# Patient Record
Sex: FEMALE | Race: WHITE | HISPANIC OR LATINO | Employment: UNEMPLOYED | URBAN - METROPOLITAN AREA
[De-identification: names, ages, dates, MRNs, and addresses within clinical notes are randomized per-mention and may not be internally consistent; named-entity substitution may affect disease eponyms.]

---

## 2023-06-14 ENCOUNTER — OFFICE VISIT (OUTPATIENT)
Dept: PRIMARY CARE | Facility: CLINIC | Age: 23
End: 2023-06-14
Payer: MEDICARE

## 2023-06-14 VITALS
BODY MASS INDEX: 27.54 KG/M2 | OXYGEN SATURATION: 98 % | HEART RATE: 80 BPM | RESPIRATION RATE: 17 BRPM | DIASTOLIC BLOOD PRESSURE: 62 MMHG | HEIGHT: 55 IN | SYSTOLIC BLOOD PRESSURE: 91 MMHG | WEIGHT: 119 LBS

## 2023-06-14 DIAGNOSIS — E03.8 OTHER SPECIFIED HYPOTHYROIDISM: ICD-10-CM

## 2023-06-14 DIAGNOSIS — Q90.9 DOWN SYNDROME (HHS-HCC): ICD-10-CM

## 2023-06-14 DIAGNOSIS — H60.501 ACUTE OTITIS EXTERNA OF RIGHT EAR, UNSPECIFIED TYPE: ICD-10-CM

## 2023-06-14 DIAGNOSIS — E66.9 OBESITY, UNSPECIFIED CLASSIFICATION, UNSPECIFIED OBESITY TYPE, UNSPECIFIED WHETHER SERIOUS COMORBIDITY PRESENT: ICD-10-CM

## 2023-06-14 DIAGNOSIS — Z00.00 HEALTH MAINTENANCE EXAMINATION: Primary | ICD-10-CM

## 2023-06-14 DIAGNOSIS — L98.9 SKIN LESION: ICD-10-CM

## 2023-06-14 DIAGNOSIS — L73.9 FOLLICULITIS: ICD-10-CM

## 2023-06-14 DIAGNOSIS — D80.4 IGM DEFICIENCY (MULTI): ICD-10-CM

## 2023-06-14 DIAGNOSIS — E55.9 MILD VITAMIN D DEFICIENCY: ICD-10-CM

## 2023-06-14 LAB
ALANINE AMINOTRANSFERASE (SGPT) (U/L) IN SER/PLAS: 23 U/L (ref 7–45)
ALBUMIN (G/DL) IN SER/PLAS: 4.6 G/DL (ref 3.4–5)
ALKALINE PHOSPHATASE (U/L) IN SER/PLAS: 63 U/L (ref 33–110)
ANION GAP IN SER/PLAS: 9 MMOL/L (ref 10–20)
ASPARTATE AMINOTRANSFERASE (SGOT) (U/L) IN SER/PLAS: 17 U/L (ref 9–39)
BASOPHILS (10*3/UL) IN BLOOD BY AUTOMATED COUNT: 0.07 X10E9/L (ref 0–0.1)
BASOPHILS/100 LEUKOCYTES IN BLOOD BY AUTOMATED COUNT: 1.3 % (ref 0–2)
BILIRUBIN TOTAL (MG/DL) IN SER/PLAS: 0.5 MG/DL (ref 0–1.2)
CALCIDIOL (25 OH VITAMIN D3) (NG/ML) IN SER/PLAS: 26 NG/ML
CALCIUM (MG/DL) IN SER/PLAS: 9.5 MG/DL (ref 8.6–10.6)
CARBON DIOXIDE, TOTAL (MMOL/L) IN SER/PLAS: 33 MMOL/L (ref 21–32)
CHLORIDE (MMOL/L) IN SER/PLAS: 102 MMOL/L (ref 98–107)
CHOLESTEROL (MG/DL) IN SER/PLAS: 188 MG/DL (ref 0–199)
CHOLESTEROL IN HDL (MG/DL) IN SER/PLAS: 54.5 MG/DL
CHOLESTEROL/HDL RATIO: 3.4
CREATININE (MG/DL) IN SER/PLAS: 0.69 MG/DL (ref 0.5–1.05)
EOSINOPHILS (10*3/UL) IN BLOOD BY AUTOMATED COUNT: 0.05 X10E9/L (ref 0–0.7)
EOSINOPHILS/100 LEUKOCYTES IN BLOOD BY AUTOMATED COUNT: 0.9 % (ref 0–6)
ERYTHROCYTE DISTRIBUTION WIDTH (RATIO) BY AUTOMATED COUNT: 13.8 % (ref 11.5–14.5)
ERYTHROCYTE MEAN CORPUSCULAR HEMOGLOBIN CONCENTRATION (G/DL) BY AUTOMATED: 32.5 G/DL (ref 32–36)
ERYTHROCYTE MEAN CORPUSCULAR VOLUME (FL) BY AUTOMATED COUNT: 102 FL (ref 80–100)
ERYTHROCYTES (10*6/UL) IN BLOOD BY AUTOMATED COUNT: 4.14 X10E12/L (ref 4–5.2)
GFR FEMALE: >90 ML/MIN/1.73M2
GLUCOSE (MG/DL) IN SER/PLAS: 84 MG/DL (ref 74–99)
HEMATOCRIT (%) IN BLOOD BY AUTOMATED COUNT: 42.2 % (ref 36–46)
HEMOGLOBIN (G/DL) IN BLOOD: 13.7 G/DL (ref 12–16)
IGA (MG/DL) IN SER/PLAS: 275 MG/DL (ref 70–400)
IGG (MG/DL) IN SER/PLAS: 957 MG/DL (ref 700–1600)
IGM (MG/DL) IN SER/PLAS: 46 MG/DL (ref 40–230)
IMMATURE GRANULOCYTES/100 LEUKOCYTES IN BLOOD BY AUTOMATED COUNT: 0.2 % (ref 0–0.9)
LEUKOCYTES (10*3/UL) IN BLOOD BY AUTOMATED COUNT: 5.3 X10E9/L (ref 4.4–11.3)
LYMPHOCYTES (10*3/UL) IN BLOOD BY AUTOMATED COUNT: 1.02 X10E9/L (ref 1.2–4.8)
LYMPHOCYTES/100 LEUKOCYTES IN BLOOD BY AUTOMATED COUNT: 19.4 % (ref 13–44)
MONOCYTES (10*3/UL) IN BLOOD BY AUTOMATED COUNT: 0.24 X10E9/L (ref 0.1–1)
MONOCYTES/100 LEUKOCYTES IN BLOOD BY AUTOMATED COUNT: 4.6 % (ref 2–10)
NEUTROPHILS (10*3/UL) IN BLOOD BY AUTOMATED COUNT: 3.88 X10E9/L (ref 1.2–7.7)
NEUTROPHILS/100 LEUKOCYTES IN BLOOD BY AUTOMATED COUNT: 73.6 % (ref 40–80)
NON-HDL CHOLESTEROL: 134 MG/DL (ref 0–149)
NRBC (PER 100 WBCS) BY AUTOMATED COUNT: 0 /100 WBC (ref 0–0)
PLATELETS (10*3/UL) IN BLOOD AUTOMATED COUNT: 343 X10E9/L (ref 150–450)
POTASSIUM (MMOL/L) IN SER/PLAS: 4.4 MMOL/L (ref 3.5–5.3)
PROTEIN TOTAL: 7.3 G/DL (ref 6.4–8.2)
SODIUM (MMOL/L) IN SER/PLAS: 140 MMOL/L (ref 136–145)
THYROTROPIN (MIU/L) IN SER/PLAS BY DETECTION LIMIT <= 0.05 MIU/L: 2.78 MIU/L (ref 0.44–3.98)
UREA NITROGEN (MG/DL) IN SER/PLAS: 19 MG/DL (ref 6–23)

## 2023-06-14 PROCEDURE — 82465 ASSAY BLD/SERUM CHOLESTEROL: CPT

## 2023-06-14 PROCEDURE — 82784 ASSAY IGA/IGD/IGG/IGM EACH: CPT

## 2023-06-14 PROCEDURE — 1036F TOBACCO NON-USER: CPT | Performed by: STUDENT IN AN ORGANIZED HEALTH CARE EDUCATION/TRAINING PROGRAM

## 2023-06-14 PROCEDURE — 83718 ASSAY OF LIPOPROTEIN: CPT

## 2023-06-14 PROCEDURE — 82306 VITAMIN D 25 HYDROXY: CPT

## 2023-06-14 PROCEDURE — 84443 ASSAY THYROID STIM HORMONE: CPT

## 2023-06-14 PROCEDURE — 80053 COMPREHEN METABOLIC PANEL: CPT

## 2023-06-14 PROCEDURE — 99395 PREV VISIT EST AGE 18-39: CPT | Performed by: STUDENT IN AN ORGANIZED HEALTH CARE EDUCATION/TRAINING PROGRAM

## 2023-06-14 PROCEDURE — 85025 COMPLETE CBC W/AUTO DIFF WBC: CPT

## 2023-06-14 RX ORDER — CLINDAMYCIN PHOSPHATE 10 UG/ML
LOTION TOPICAL 2 TIMES DAILY
Qty: 60 ML | Refills: 3 | Status: SHIPPED | OUTPATIENT
Start: 2023-06-14

## 2023-06-14 RX ORDER — CLINDAMYCIN PHOSPHATE 10 MG/G
GEL TOPICAL 2 TIMES DAILY
Qty: 60 G | Refills: 5 | Status: SHIPPED | OUTPATIENT
Start: 2023-06-14 | End: 2023-06-14

## 2023-06-14 RX ORDER — MUPIROCIN 20 MG/G
OINTMENT TOPICAL 2 TIMES DAILY
Qty: 22 G | Refills: 0 | Status: SHIPPED | OUTPATIENT
Start: 2023-06-14 | End: 2023-06-24

## 2023-06-14 RX ORDER — CIPROFLOXACIN AND DEXAMETHASONE 3; 1 MG/ML; MG/ML
4 SUSPENSION/ DROPS AURICULAR (OTIC) 2 TIMES DAILY
Qty: 7.5 ML | Refills: 0 | Status: SHIPPED | OUTPATIENT
Start: 2023-06-14 | End: 2023-06-21

## 2023-06-14 NOTE — PATIENT INSTRUCTIONS
Health Passport: [email or web address]    Labs today in [address].    Referrals:   Dermatology  Immunology (Dr. LAVON Gallegos recommended)     Prescriptions:   Ciprodex ear drops--call me if too expensive  Mupirocin for folliculitis   Clindamycin topical for groin skin lesions     Follow up with oncology survivorship and for echo    Pleasure meeting you today!    Best,  Dr. Sierra

## 2023-06-14 NOTE — PROGRESS NOTES
Estela Molina is a 23 y.o. female seen in Clinic at /[facility] by Dr. 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 She presents today to establish care and for CPE. Patient is accompanied to this visit by her mother.    ACUTE CONCERNS:  #R Otitis Externa  - active drainage/purulence of R ear today on exam  - mild pain with manipulation   - treat with topical fluoroquinolone   - prior recurrent otitis; tube in childhood    CHRONIC MEDICAL CONDITIONS:   #Down Syndrome  #HypoT (off meds for last 3-4 months, previously 37.5mcg daily)  #AML (diagnosed 2000)  #ASD/VSD (no surgical intervention)   - follows with peds onc survivorship, peds endo  - labs today   - adult down syndrome screening guidelines  - prior TTG testing negative, no GI concerns  - TSH with screening labs  - no concerns for atlanto-axial instability per ROS   - echo with onc survivorship   [  ] follow up with onc survivorship   [  ] labs as above   [  ] immunology referral     #IgM Deficiency   - last saw Dr. Quevedo several years ago   - Immunoglobulins ordered today   [  ] Immunology referral     #Dysmenorrhea, improved   - now s/p IUD  - Dr. Vasques, GYN  - mild spotting, cramps; much improved with IUD     #Concern for HS  - trial topical clinda  - derm referral   - has had drainage with general surgery in the past     #Folliculitis   - topical mupirocin     #Chronic Constipation   - has been evaluated by GI in the past   - bowel regimen with good control at home currently     Past Medical History:   Past Medical History:   Diagnosis Date    Personal history of leukemia 01/22/2015    History of leukemia    Personal history of leukemia 08/23/2021    History of acute myeloid leukemia  in remission    Personal history of other infectious and parasitic diseases 2020    History of viral infection    Selective deficiency of immunoglobulin m (igm) (CMS/HCC)     Decreased immunoglobulin M     Subspecialty Medical Care: Peds ([facility])  Identified Adult Providers: Endo, GYN, Onc Survivorship, Immunology     Past Surgical History:   Past Surgical History:   Procedure Laterality Date    OTHER SURGICAL HISTORY  2021    Bone biopsy    OTHER SURGICAL HISTORY  2021    Bone marrow biopsy    OTHER SURGICAL HISTORY  2022    Ear pressure equalization tube insertion     Medications: previously Levothyroxine 37.5mcg daily   Current Outpatient Medications:     ciprofloxacin-dexamethasone (CiproDEX) otic suspension, Administer 4 drops into the right ear 2 times a day for 7 days., Disp: 7.5 mL, Rfl: 0    clindamycin (Clindagel) 1 % gel, Apply topically 2 times a day. apply to affected area, Disp: 60 g, Rfl: 5    mupirocin (Bactroban) 2 % ointment, Apply topically 2 times a day for 10 days. PRN for folliculitis, Disp: 22 g, Rfl: 0  Pharmacy: [email or web address] ([facility])     Allergies: NKDA  No Known Allergies    Immunizations: Tdap  (due ), likely PCV/PPSV needed; COVID primary series (no booster), HPV vaccine series complete, flu annually recommended     Family History:   No family history on file.    Social History:   Home/Living Situation: lives at home with mom, acacia, and younger brother; father  around    Education: graduated, [place]   Employment/Work/Vocational: work program, [employer]   Activities: capable of working but family feels not safe given wandering   Drug Use: non-smoker   Diet: healthy dietary habits discussed  Sexuality/Contraception/Menstrual History: follows with GYN  Suicide/Depression/Anxiety: no mood concerns, father  around 8 years ago (will make her sad)   Sleep:     Transition  "Processes:  Financial/Health Insurance: Medicare  Transportation: mother   Voting: NA  Legal/Guardian: Millie Martin  Contact Information: [phone]    Visit Vitals  BP 91/62 (BP Location: Left arm, Patient Position: Sitting)   Pulse 80   Resp 17   Ht 1.397 m (4' 7\")   Wt 54 kg (119 lb)   SpO2 98%   BMI 27.66 kg/m²   Smoking Status Never   BSA 1.45 m²      PHYSICAL EXAM:   General: well appearing, facies consistent with known Down Syndrome diagnosis, NAD, pleasant and engaged in encounter    HEENT: R ear with active yellow drainage  CV: RRR  PULM: CTAB, non-labored respirations   ABD: soft, overweight, NT, ND   : no suprapubic tenderness   EXT: WWP, no significant edema   SKIN: areas of folliculitis noted   NEURO: A&Ox4, down syndrome facies, no gross motor or sensory deficits, normal gait  PSYCH: pleasant mood, appropriate affect, appropriate interactions with mother during visit     Assessment/Plan    Estela Molina is a 23 y.o. female seen in Clinic at /[facility] by Dr. 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 Patient is accompanied to this visit by her mother.    ACUTE CONCERNS  #R Otitis Externa  - active drainage/purulence of R ear today on exam  - mild pain with manipulation   - treat with topical fluoroquinolone   - prior recurrent otitis; tube in childhood    CHRONIC MEDICAL CONDITIONS:   #Down Syndrome  #HypoT (off meds for last 3-4 months, previously 37.5mcg daily)  #AML (diagnosed 2000)  #ASD/VSD (no surgical intervention)   - follows with peds onc survivorship, peds endo  - labs today   - adult down syndrome screening guidelines  - prior TTG testing negative, no GI concerns  - TSH with screening labs  - no concerns " for atlanto-axial instability per ROS   - echo with onc survivorship   [  ] follow up with onc survivorship   [  ] labs as above   [  ] immunology referral     #IgM Deficiency   - last saw Dr. Quevedo several years ago   - Immunoglobulins ordered today   [  ] Immunology referral     #Dysmenorrhea, improved   - now s/p IUD  - Dr. Vasques, GYN  - mild spotting, cramps; much improved with IUD     #Concern for HS  - trial topical clinda  - derm referral   - has had drainage with general surgery in the past     #Folliculitis   - topical mupirocin     #Chronic Constipation   - has been evaluated by GI in the past   - bowel regimen with good control at home currently     #Health Maintenance   - Vision, Hearing screens: recommend audiology, optho  - Counseling regarding alcohol/tobacco/drug use: denies  - Depression screen: no concerns at present   - BMI, Lipid, A1C screening and nutritional/exercise counseling: labs today   - Blood Pressure: WNL  - Safe Sexual Practices, STI, HIV screening: never sexually active, IUD in place  - Pap Smear (21 years and above): follows with GYN    #Transition of Care/Young Adult Care  - Health Literacy Assessment: mother excellent advocate for her daughter   - Medications: Active medications reviewed and updated  - Allergies: Reviewed and updated   - Immunizations: Tdap 2014 (due 2024), likely PCV/PPSV needed; COVID primary series (no booster), HPV vaccine series complete, flu annually recommended   - Identified Adult or Subspecialty Providers: Endo, GYN, Onc Survivorship, Immunology   - Resources Provided: Immunology, Derm referrals, Lab screening today, Adult Down Syndrome Checklist (Global Medical Care Guidelines)   - Health Passport: Reviewed resource for relevant patients ([email or web address])  - Healthcare POA: Mother Contact Info: [phone] ([email or web address])      Cristóbal Sierra MD  Internal Medicine-Pediatrics    [address], [address]  P: [phone], F: [phone]

## 2023-06-15 DIAGNOSIS — E55.9 MILD VITAMIN D DEFICIENCY: Primary | ICD-10-CM

## 2023-06-15 RX ORDER — ASCORBIC ACID 125 MG
1 TABLET,CHEWABLE ORAL DAILY
Qty: 90 TABLET | Refills: 3 | Status: SHIPPED | OUTPATIENT
Start: 2023-06-15

## 2023-12-15 ENCOUNTER — APPOINTMENT (OUTPATIENT)
Dept: DERMATOLOGY | Facility: CLINIC | Age: 23
End: 2023-12-15
Payer: MEDICARE

## 2024-05-16 ENCOUNTER — APPOINTMENT (OUTPATIENT)
Dept: RADIOLOGY | Facility: HOSPITAL | Age: 24
End: 2024-05-16
Payer: MEDICARE

## 2024-05-16 ENCOUNTER — HOSPITAL ENCOUNTER (EMERGENCY)
Facility: HOSPITAL | Age: 24
Discharge: HOME | End: 2024-05-16
Attending: EMERGENCY MEDICINE
Payer: MEDICARE

## 2024-05-16 VITALS
DIASTOLIC BLOOD PRESSURE: 66 MMHG | TEMPERATURE: 97.9 F | SYSTOLIC BLOOD PRESSURE: 106 MMHG | HEART RATE: 110 BPM | RESPIRATION RATE: 18 BRPM | BODY MASS INDEX: 27.77 KG/M2 | WEIGHT: 120 LBS | OXYGEN SATURATION: 99 % | HEIGHT: 55 IN

## 2024-05-16 VITALS
OXYGEN SATURATION: 100 % | RESPIRATION RATE: 16 BRPM | SYSTOLIC BLOOD PRESSURE: 105 MMHG | TEMPERATURE: 98.8 F | DIASTOLIC BLOOD PRESSURE: 63 MMHG | BODY MASS INDEX: 27.77 KG/M2 | HEART RATE: 97 BPM | HEIGHT: 55 IN | WEIGHT: 120 LBS

## 2024-05-16 DIAGNOSIS — R11.2 NAUSEA AND VOMITING, UNSPECIFIED VOMITING TYPE: ICD-10-CM

## 2024-05-16 DIAGNOSIS — R10.84 GENERALIZED ABDOMINAL PAIN: Primary | ICD-10-CM

## 2024-05-16 DIAGNOSIS — R10.84 ABDOMINAL PAIN, GENERALIZED: Primary | ICD-10-CM

## 2024-05-16 LAB
ALBUMIN SERPL BCP-MCNC: 4 G/DL (ref 3.4–5)
ALBUMIN SERPL BCP-MCNC: 4.5 G/DL (ref 3.4–5)
ALP SERPL-CCNC: 44 U/L (ref 33–110)
ALP SERPL-CCNC: 52 U/L (ref 33–110)
ALT SERPL W P-5'-P-CCNC: 26 U/L (ref 7–45)
ALT SERPL W P-5'-P-CCNC: 27 U/L (ref 7–45)
ANION GAP SERPL CALC-SCNC: 13 MMOL/L (ref 10–20)
ANION GAP SERPL CALC-SCNC: 13 MMOL/L (ref 10–20)
APPEARANCE UR: CLEAR
APPEARANCE UR: CLEAR
AST SERPL W P-5'-P-CCNC: 20 U/L (ref 9–39)
AST SERPL W P-5'-P-CCNC: 21 U/L (ref 9–39)
B-HCG SERPL-ACNC: <2 MIU/ML
BACTERIA #/AREA URNS AUTO: ABNORMAL /HPF
BASOPHILS # BLD AUTO: 0.03 X10*3/UL (ref 0–0.1)
BASOPHILS NFR BLD AUTO: 0.3 %
BILIRUB SERPL-MCNC: 0.8 MG/DL (ref 0–1.2)
BILIRUB SERPL-MCNC: 0.9 MG/DL (ref 0–1.2)
BILIRUB UR STRIP.AUTO-MCNC: NEGATIVE MG/DL
BILIRUB UR STRIP.AUTO-MCNC: NEGATIVE MG/DL
BUN SERPL-MCNC: 13 MG/DL (ref 6–23)
BUN SERPL-MCNC: 16 MG/DL (ref 6–23)
CALCIUM SERPL-MCNC: 8.3 MG/DL (ref 8.6–10.3)
CALCIUM SERPL-MCNC: 8.8 MG/DL (ref 8.6–10.3)
CHLORIDE SERPL-SCNC: 99 MMOL/L (ref 98–107)
CHLORIDE SERPL-SCNC: 99 MMOL/L (ref 98–107)
CO2 SERPL-SCNC: 25 MMOL/L (ref 21–32)
CO2 SERPL-SCNC: 27 MMOL/L (ref 21–32)
COLOR UR: YELLOW
COLOR UR: YELLOW
CREAT SERPL-MCNC: 0.74 MG/DL (ref 0.5–1.05)
CREAT SERPL-MCNC: 0.77 MG/DL (ref 0.5–1.05)
EGFRCR SERPLBLD CKD-EPI 2021: >90 ML/MIN/1.73M*2
EGFRCR SERPLBLD CKD-EPI 2021: >90 ML/MIN/1.73M*2
EOSINOPHIL # BLD AUTO: 0.01 X10*3/UL (ref 0–0.7)
EOSINOPHIL NFR BLD AUTO: 0.1 %
ERYTHROCYTE [DISTWIDTH] IN BLOOD BY AUTOMATED COUNT: 13.6 % (ref 11.5–14.5)
ERYTHROCYTE [DISTWIDTH] IN BLOOD BY AUTOMATED COUNT: 13.9 % (ref 11.5–14.5)
GLUCOSE SERPL-MCNC: 109 MG/DL (ref 74–99)
GLUCOSE SERPL-MCNC: 143 MG/DL (ref 74–99)
GLUCOSE UR STRIP.AUTO-MCNC: NORMAL MG/DL
GLUCOSE UR STRIP.AUTO-MCNC: NORMAL MG/DL
HCT VFR BLD AUTO: 38.1 % (ref 36–46)
HCT VFR BLD AUTO: 40.7 % (ref 36–46)
HGB BLD-MCNC: 12.6 G/DL (ref 12–16)
HGB BLD-MCNC: 13.6 G/DL (ref 12–16)
HOLD SPECIMEN: NORMAL
IMM GRANULOCYTES # BLD AUTO: 0.04 X10*3/UL (ref 0–0.7)
IMM GRANULOCYTES NFR BLD AUTO: 0.3 % (ref 0–0.9)
KETONES UR STRIP.AUTO-MCNC: ABNORMAL MG/DL
KETONES UR STRIP.AUTO-MCNC: ABNORMAL MG/DL
LACTATE SERPL-SCNC: 1.3 MMOL/L (ref 0.4–2)
LEUKOCYTE ESTERASE UR QL STRIP.AUTO: NEGATIVE
LEUKOCYTE ESTERASE UR QL STRIP.AUTO: NEGATIVE
LIPASE SERPL-CCNC: 6 U/L (ref 9–82)
LIPASE SERPL-CCNC: 7 U/L (ref 9–82)
LYMPHOCYTES # BLD AUTO: 0.14 X10*3/UL (ref 1.2–4.8)
LYMPHOCYTES NFR BLD AUTO: 1.2 %
MAGNESIUM SERPL-MCNC: 1.73 MG/DL (ref 1.6–2.4)
MCH RBC QN AUTO: 32.7 PG (ref 26–34)
MCH RBC QN AUTO: 32.9 PG (ref 26–34)
MCHC RBC AUTO-ENTMCNC: 33.1 G/DL (ref 32–36)
MCHC RBC AUTO-ENTMCNC: 33.4 G/DL (ref 32–36)
MCV RBC AUTO: 99 FL (ref 80–100)
MCV RBC AUTO: 99 FL (ref 80–100)
MONOCYTES # BLD AUTO: 0.45 X10*3/UL (ref 0.1–1)
MONOCYTES NFR BLD AUTO: 3.9 %
MUCOUS THREADS #/AREA URNS AUTO: ABNORMAL /LPF
MUCOUS THREADS #/AREA URNS AUTO: ABNORMAL /LPF
NEUTROPHILS # BLD AUTO: 10.84 X10*3/UL (ref 1.2–7.7)
NEUTROPHILS NFR BLD AUTO: 94.2 %
NITRITE UR QL STRIP.AUTO: NEGATIVE
NITRITE UR QL STRIP.AUTO: NEGATIVE
NRBC BLD-RTO: 0 /100 WBCS (ref 0–0)
NRBC BLD-RTO: 0 /100 WBCS (ref 0–0)
PH UR STRIP.AUTO: 6.5 [PH]
PH UR STRIP.AUTO: 8 [PH]
PLATELET # BLD AUTO: 223 X10*3/UL (ref 150–450)
PLATELET # BLD AUTO: 279 X10*3/UL (ref 150–450)
POTASSIUM SERPL-SCNC: 3.2 MMOL/L (ref 3.5–5.3)
POTASSIUM SERPL-SCNC: 3.6 MMOL/L (ref 3.5–5.3)
PROT SERPL-MCNC: 6.5 G/DL (ref 6.4–8.2)
PROT SERPL-MCNC: 7.4 G/DL (ref 6.4–8.2)
PROT UR STRIP.AUTO-MCNC: ABNORMAL MG/DL
PROT UR STRIP.AUTO-MCNC: ABNORMAL MG/DL
RBC # BLD AUTO: 3.85 X10*6/UL (ref 4–5.2)
RBC # BLD AUTO: 4.13 X10*6/UL (ref 4–5.2)
RBC # UR STRIP.AUTO: NEGATIVE /UL
RBC # UR STRIP.AUTO: NEGATIVE /UL
RBC #/AREA URNS AUTO: ABNORMAL /HPF
RBC #/AREA URNS AUTO: ABNORMAL /HPF
SARS-COV-2 RNA RESP QL NAA+PROBE: NOT DETECTED
SODIUM SERPL-SCNC: 134 MMOL/L (ref 136–145)
SODIUM SERPL-SCNC: 135 MMOL/L (ref 136–145)
SP GR UR STRIP.AUTO: 1.03
SP GR UR STRIP.AUTO: 1.04
SQUAMOUS #/AREA URNS AUTO: ABNORMAL /HPF
SQUAMOUS #/AREA URNS AUTO: ABNORMAL /HPF
UROBILINOGEN UR STRIP.AUTO-MCNC: ABNORMAL MG/DL
UROBILINOGEN UR STRIP.AUTO-MCNC: NORMAL MG/DL
WBC # BLD AUTO: 11.5 X10*3/UL (ref 4.4–11.3)
WBC # BLD AUTO: 8.3 X10*3/UL (ref 4.4–11.3)
WBC #/AREA URNS AUTO: ABNORMAL /HPF
WBC #/AREA URNS AUTO: ABNORMAL /HPF
YEAST BUDDING #/AREA UR COMP ASSIST: PRESENT /HPF

## 2024-05-16 PROCEDURE — 81003 URINALYSIS AUTO W/O SCOPE: CPT | Performed by: NURSE PRACTITIONER

## 2024-05-16 PROCEDURE — 85025 COMPLETE CBC W/AUTO DIFF WBC: CPT | Performed by: NURSE PRACTITIONER

## 2024-05-16 PROCEDURE — 99283 EMERGENCY DEPT VISIT LOW MDM: CPT | Mod: 25

## 2024-05-16 PROCEDURE — 99284 EMERGENCY DEPT VISIT MOD MDM: CPT | Performed by: EMERGENCY MEDICINE

## 2024-05-16 PROCEDURE — 96360 HYDRATION IV INFUSION INIT: CPT

## 2024-05-16 PROCEDURE — 84702 CHORIONIC GONADOTROPIN TEST: CPT | Performed by: NURSE PRACTITIONER

## 2024-05-16 PROCEDURE — 85027 COMPLETE CBC AUTOMATED: CPT | Performed by: EMERGENCY MEDICINE

## 2024-05-16 PROCEDURE — 96374 THER/PROPH/DIAG INJ IV PUSH: CPT

## 2024-05-16 PROCEDURE — 83690 ASSAY OF LIPASE: CPT | Performed by: NURSE PRACTITIONER

## 2024-05-16 PROCEDURE — 83605 ASSAY OF LACTIC ACID: CPT | Performed by: NURSE PRACTITIONER

## 2024-05-16 PROCEDURE — 36415 COLL VENOUS BLD VENIPUNCTURE: CPT | Performed by: NURSE PRACTITIONER

## 2024-05-16 PROCEDURE — 99285 EMERGENCY DEPT VISIT HI MDM: CPT | Performed by: NURSE PRACTITIONER

## 2024-05-16 PROCEDURE — 2550000001 HC RX 255 CONTRASTS: Performed by: NURSE PRACTITIONER

## 2024-05-16 PROCEDURE — 96375 TX/PRO/DX INJ NEW DRUG ADDON: CPT

## 2024-05-16 PROCEDURE — 74177 CT ABD & PELVIS W/CONTRAST: CPT

## 2024-05-16 PROCEDURE — 83735 ASSAY OF MAGNESIUM: CPT | Performed by: EMERGENCY MEDICINE

## 2024-05-16 PROCEDURE — 87635 SARS-COV-2 COVID-19 AMP PRB: CPT | Performed by: NURSE PRACTITIONER

## 2024-05-16 PROCEDURE — 2500000004 HC RX 250 GENERAL PHARMACY W/ HCPCS (ALT 636 FOR OP/ED): Performed by: NURSE PRACTITIONER

## 2024-05-16 PROCEDURE — 80053 COMPREHEN METABOLIC PANEL: CPT | Mod: 91 | Performed by: EMERGENCY MEDICINE

## 2024-05-16 PROCEDURE — 81001 URINALYSIS AUTO W/SCOPE: CPT | Performed by: EMERGENCY MEDICINE

## 2024-05-16 PROCEDURE — 2500000001 HC RX 250 WO HCPCS SELF ADMINISTERED DRUGS (ALT 637 FOR MEDICARE OP): Performed by: NURSE PRACTITIONER

## 2024-05-16 PROCEDURE — 93005 ELECTROCARDIOGRAM TRACING: CPT

## 2024-05-16 PROCEDURE — 2500000001 HC RX 250 WO HCPCS SELF ADMINISTERED DRUGS (ALT 637 FOR MEDICARE OP): Performed by: EMERGENCY MEDICINE

## 2024-05-16 PROCEDURE — 2500000005 HC RX 250 GENERAL PHARMACY W/O HCPCS: Performed by: STUDENT IN AN ORGANIZED HEALTH CARE EDUCATION/TRAINING PROGRAM

## 2024-05-16 PROCEDURE — 83690 ASSAY OF LIPASE: CPT | Mod: 91 | Performed by: EMERGENCY MEDICINE

## 2024-05-16 PROCEDURE — 96361 HYDRATE IV INFUSION ADD-ON: CPT

## 2024-05-16 PROCEDURE — 74177 CT ABD & PELVIS W/CONTRAST: CPT | Mod: FOREIGN READ | Performed by: RADIOLOGY

## 2024-05-16 PROCEDURE — 99285 EMERGENCY DEPT VISIT HI MDM: CPT | Mod: 25

## 2024-05-16 PROCEDURE — 2500000004 HC RX 250 GENERAL PHARMACY W/ HCPCS (ALT 636 FOR OP/ED): Performed by: EMERGENCY MEDICINE

## 2024-05-16 PROCEDURE — 84075 ASSAY ALKALINE PHOSPHATASE: CPT | Performed by: NURSE PRACTITIONER

## 2024-05-16 RX ORDER — DICYCLOMINE HYDROCHLORIDE 10 MG/1
20 CAPSULE ORAL ONCE
Status: COMPLETED | OUTPATIENT
Start: 2024-05-16 | End: 2024-05-16

## 2024-05-16 RX ORDER — ONDANSETRON HYDROCHLORIDE 2 MG/ML
4 INJECTION, SOLUTION INTRAVENOUS ONCE
Status: COMPLETED | OUTPATIENT
Start: 2024-05-16 | End: 2024-05-16

## 2024-05-16 RX ORDER — ACETAMINOPHEN 325 MG/1
650 TABLET ORAL ONCE
Status: COMPLETED | OUTPATIENT
Start: 2024-05-16 | End: 2024-05-16

## 2024-05-16 RX ORDER — FLUCONAZOLE 150 MG/1
150 TABLET ORAL ONCE
Status: COMPLETED | OUTPATIENT
Start: 2024-05-16 | End: 2024-05-16

## 2024-05-16 RX ORDER — POTASSIUM CHLORIDE 1.5 G/1.58G
40 POWDER, FOR SOLUTION ORAL ONCE
Status: COMPLETED | OUTPATIENT
Start: 2024-05-16 | End: 2024-05-16

## 2024-05-16 RX ORDER — ONDANSETRON 4 MG/1
4 TABLET, ORALLY DISINTEGRATING ORAL EVERY 6 HOURS PRN
Qty: 12 TABLET | Refills: 0 | Status: SHIPPED | OUTPATIENT
Start: 2024-05-16 | End: 2024-05-19

## 2024-05-16 RX ORDER — ONDANSETRON 4 MG/1
4 TABLET, ORALLY DISINTEGRATING ORAL ONCE
Status: COMPLETED | OUTPATIENT
Start: 2024-05-16 | End: 2024-05-16

## 2024-05-16 RX ORDER — KETOROLAC TROMETHAMINE 15 MG/ML
15 INJECTION, SOLUTION INTRAMUSCULAR; INTRAVENOUS ONCE
Status: COMPLETED | OUTPATIENT
Start: 2024-05-16 | End: 2024-05-16

## 2024-05-16 RX ADMIN — FLUCONAZOLE 150 MG: 150 TABLET ORAL at 10:17

## 2024-05-16 RX ADMIN — ONDANSETRON 4 MG: 2 INJECTION INTRAMUSCULAR; INTRAVENOUS at 07:48

## 2024-05-16 RX ADMIN — IOHEXOL 75 ML: 350 INJECTION, SOLUTION INTRAVENOUS at 09:10

## 2024-05-16 RX ADMIN — KETOROLAC TROMETHAMINE 15 MG: 15 INJECTION, SOLUTION INTRAMUSCULAR; INTRAVENOUS at 07:49

## 2024-05-16 RX ADMIN — DICYCLOMINE HYDROCHLORIDE 20 MG: 10 CAPSULE ORAL at 10:20

## 2024-05-16 RX ADMIN — SODIUM CHLORIDE, POTASSIUM CHLORIDE, SODIUM LACTATE AND CALCIUM CHLORIDE 1000 ML: 600; 310; 30; 20 INJECTION, SOLUTION INTRAVENOUS at 21:52

## 2024-05-16 RX ADMIN — ACETAMINOPHEN 650 MG: 325 TABLET ORAL at 10:17

## 2024-05-16 RX ADMIN — SODIUM CHLORIDE 1000 ML: 9 INJECTION, SOLUTION INTRAVENOUS at 07:50

## 2024-05-16 RX ADMIN — SODIUM CHLORIDE, POTASSIUM CHLORIDE, SODIUM LACTATE AND CALCIUM CHLORIDE 1000 ML: 600; 310; 30; 20 INJECTION, SOLUTION INTRAVENOUS at 21:51

## 2024-05-16 RX ADMIN — ONDANSETRON 4 MG: 4 TABLET, ORALLY DISINTEGRATING ORAL at 20:53

## 2024-05-16 RX ADMIN — POTASSIUM CHLORIDE 40 MEQ: 1.5 POWDER, FOR SOLUTION ORAL at 22:07

## 2024-05-16 ASSESSMENT — PAIN DESCRIPTION - FREQUENCY
FREQUENCY: CONSTANT/CONTINUOUS
FREQUENCY: CONSTANT/CONTINUOUS

## 2024-05-16 ASSESSMENT — LIFESTYLE VARIABLES
HAVE YOU EVER FELT YOU SHOULD CUT DOWN ON YOUR DRINKING: NO
EVER HAD A DRINK FIRST THING IN THE MORNING TO STEADY YOUR NERVES TO GET RID OF A HANGOVER: NO
TOTAL SCORE: 0
HAVE YOU EVER FELT YOU SHOULD CUT DOWN ON YOUR DRINKING: NO
EVER FELT BAD OR GUILTY ABOUT YOUR DRINKING: NO
TOTAL SCORE: 0
EVER FELT BAD OR GUILTY ABOUT YOUR DRINKING: NO
HAVE PEOPLE ANNOYED YOU BY CRITICIZING YOUR DRINKING: NO
EVER HAD A DRINK FIRST THING IN THE MORNING TO STEADY YOUR NERVES TO GET RID OF A HANGOVER: NO
HAVE PEOPLE ANNOYED YOU BY CRITICIZING YOUR DRINKING: NO

## 2024-05-16 ASSESSMENT — COLUMBIA-SUICIDE SEVERITY RATING SCALE - C-SSRS
1. IN THE PAST MONTH, HAVE YOU WISHED YOU WERE DEAD OR WISHED YOU COULD GO TO SLEEP AND NOT WAKE UP?: NO
1. IN THE PAST MONTH, HAVE YOU WISHED YOU WERE DEAD OR WISHED YOU COULD GO TO SLEEP AND NOT WAKE UP?: NO
2. HAVE YOU ACTUALLY HAD ANY THOUGHTS OF KILLING YOURSELF?: NO
6. HAVE YOU EVER DONE ANYTHING, STARTED TO DO ANYTHING, OR PREPARED TO DO ANYTHING TO END YOUR LIFE?: NO
6. HAVE YOU EVER DONE ANYTHING, STARTED TO DO ANYTHING, OR PREPARED TO DO ANYTHING TO END YOUR LIFE?: NO
2. HAVE YOU ACTUALLY HAD ANY THOUGHTS OF KILLING YOURSELF?: NO

## 2024-05-16 ASSESSMENT — PAIN DESCRIPTION - ONSET
ONSET: PROGRESSIVE
ONSET: ONGOING

## 2024-05-16 ASSESSMENT — PAIN DESCRIPTION - PROGRESSION
CLINICAL_PROGRESSION: NOT CHANGED
CLINICAL_PROGRESSION: GRADUALLY WORSENING

## 2024-05-16 ASSESSMENT — PAIN SCALES - GENERAL
PAINLEVEL_OUTOF10: 8
PAINLEVEL_OUTOF10: 6
PAINLEVEL_OUTOF10: 0 - NO PAIN
PAINLEVEL_OUTOF10: 10 - WORST POSSIBLE PAIN
PAINLEVEL_OUTOF10: 10 - WORST POSSIBLE PAIN

## 2024-05-16 ASSESSMENT — PAIN DESCRIPTION - PAIN TYPE
TYPE: ACUTE PAIN
TYPE: ACUTE PAIN

## 2024-05-16 ASSESSMENT — PAIN DESCRIPTION - DESCRIPTORS: DESCRIPTORS: ACHING

## 2024-05-16 ASSESSMENT — PAIN DESCRIPTION - ORIENTATION
ORIENTATION: RIGHT;LEFT
ORIENTATION: LOWER
ORIENTATION: LOWER;RIGHT

## 2024-05-16 ASSESSMENT — PAIN DESCRIPTION - LOCATION
LOCATION: ABDOMEN

## 2024-05-16 ASSESSMENT — PAIN SCALES - WONG BAKER
WONGBAKER_NUMERICALRESPONSE: HURTS LITTLE MORE
WONGBAKER_NUMERICALRESPONSE: HURTS WHOLE LOT
WONGBAKER_NUMERICALRESPONSE: HURTS LITTLE MORE

## 2024-05-16 ASSESSMENT — PAIN - FUNCTIONAL ASSESSMENT
PAIN_FUNCTIONAL_ASSESSMENT: 0-10
PAIN_FUNCTIONAL_ASSESSMENT: 0-10
PAIN_FUNCTIONAL_ASSESSMENT: WONG-BAKER FACES

## 2024-05-16 NOTE — ED PROVIDER NOTES
[place]  [facility] [place]    Patient: Estela Molina  MRN: [id number]  : 2000  Date of Evaluation: 2024  ED Provider: FERNY Wood      Chief Complaint       Chief Complaint   Patient presents with    Abdominal Pain     Lower abdomen x 10 hours    Vomiting     X 10 hours     [place]    (Location/Symptom, Timing/Onset, Context/Setting, Quality, Duration, Modifying Factors, Severity) Note limiting factors.   Limitations to History: Pt history downs syndrome, majority of history provided by mother.  Historian: Mother and patient  Records reviewed: EMR inpatient and outpatient notes, Care Everywhere      Estela Molina is a 23 y.o. female who presents to the emergency department complaining of nausea, vomiting since 2100 last night and has be unable to keep anything down since. PMH downs syndrome, leukemia in remission and IgM deficiency. Mother endorses pt has had no recent sick contacts but does attend . No fever, chills, cough, SOB, chest pain, diarrhea, dysuria, or urinary symptoms. Pt endorses diffuse abdominal tenderness throughout. No hx of abd surgery.     ROS:     Review of Systems  14 systems reviewed and otherwise acutely negative except as in the [place].          Past History     Past Medical History:   Diagnosis Date    Personal history of leukemia 2015    History of leukemia    Personal history of leukemia 2021    History of acute myeloid leukemia in remission    Personal history of other infectious and parasitic diseases 2020    History of viral infection    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)     Decreased immunoglobulin M     Past Surgical History:   Procedure Laterality Date    OTHER SURGICAL HISTORY  2021    Bone biopsy    OTHER SURGICAL HISTORY  2021    Bone marrow biopsy    OTHER SURGICAL HISTORY  2022    Ear pressure equalization tube insertion     Social History      Socioeconomic History    Marital status: Single     Spouse name: None    Number of children: None    Years of education: None    Highest education level: None   Occupational History    None   Tobacco Use    Smoking status: Never    Smokeless tobacco: Never   Vaping Use    Vaping status: Never Used   Substance and Sexual Activity    Alcohol use: Never    Drug use: Never    Sexual activity: None   Other Topics Concern    None   Social History Narrative    None     Social Determinants of Health     Financial Resource Strain: Low Risk  (11/10/2020)    Received from [facility]    Overall Financial Resource Strain (CARDIA)     Difficulty of Paying Living Expenses: Not hard at all   Food Insecurity: No Food Insecurity (11/10/2020)    Received from [facility]    Hunger Vital Sign     Worried About Running Out of Food in the Last Year: Never true     Ran Out of Food in the Last Year: Never true   Transportation Needs: No Transportation Needs (11/10/2020)    Received from [facility]    PRAPARE - Transportation     Lack of Transportation (Medical): No     Lack of Transportation (Non-Medical): No   Physical Activity: Not on file   Stress: Not on file   Social Connections: Not on file   Intimate Partner Violence: Not on file   Housing Stability: Not on file       Medications/Allergies     Previous Medications    CHOLECALCIFEROL, VITAMIN D3, (VITAMIN D3) 25 MCG (1,000 UNIT) TABLET,CHEWABLE    Chew 1 Units once daily.    CLINDAMYCIN (CLEOCIN T) 1 % LOTION    Apply topically 2 times a day.     No Known Allergies     Physical Exam       ED Triage Vitals [05/16/24 0708]   Temperature Heart Rate Respirations BP   36.9 °C (98.4 °F) (!) 117 19 139/74      Pulse Ox Temp Source Heart Rate Source Patient Position   95 % Temporal Monitor Sitting      BP Location FiO2 (%)     Right arm --         Physical Exam    GENERAL:  The patient appears fatigued upon assessment. Vital signs as documented.     HEENT:  Head  normocephalic, atraumatic, EOMs intact, PERRLA, Mucous membranes moist. Nares patent without copious rhinorrhea.  No lymphadenopathy.    PULMONARY:  Lungs are clear to auscultation, without any respiratory distress. Able to speak full sentences, no accessory muscle use    CARDIAC:   Normal rate. No murmurs, rubs or gallops    ABDOMEN:  Soft, non distended, diffuse tenderness throughout, BS hypoactive x 4 quadrants, No rebound, guarding, no peritoneal signs, no CVA tenderness, no masses or organomegaly    MUSCULOSKELETAL:   Able to ambulate, Non edematous, with no obvious deformities. Pulses intact distal    SKIN:   Good color, with no significant rashes.  No pallor.    NEURO:  No obvious neurological deficits, normal sensation and strength bilaterally.  Able to follow commands, NIH 0, CN 2-12 intact.      Diagnostics   Labs:  Labs Reviewed   CBC WITH AUTO DIFFERENTIAL - Abnormal       Result Value    WBC 11.5 (*)     nRBC 0.0      RBC 4.13      Hemoglobin 13.6      Hematocrit 40.7      MCV 99      MCH 32.9      MCHC 33.4      RDW 13.6      Platelets 279      Neutrophils % 94.2      Immature Granulocytes %, Automated 0.3      Lymphocytes % 1.2      Monocytes % 3.9      Eosinophils % 0.1      Basophils % 0.3      Neutrophils Absolute 10.84 (*)     Immature Granulocytes Absolute, Automated 0.04      Lymphocytes Absolute 0.14 (*)     Monocytes Absolute 0.45      Eosinophils Absolute 0.01      Basophils Absolute 0.03     COMPREHENSIVE METABOLIC PANEL - Abnormal    Glucose 143 (*)     Sodium 135 (*)     Potassium 3.6      Chloride 99      Bicarbonate 27      Anion Gap 13      Urea Nitrogen 16      Creatinine 0.77      eGFR >90      Calcium 8.8      Albumin 4.5      Alkaline Phosphatase 52      Total Protein 7.4      AST 20      Bilirubin, Total 0.9      ALT 27     URINALYSIS WITH REFLEX CULTURE AND MICROSCOPIC - Abnormal    Color, Urine Yellow      Appearance, Urine Clear      Specific Gravity, Urine 1.029      pH, Urine  8.0      Protein, Urine 10 (TRACE)      Glucose, Urine Normal      Blood, Urine NEGATIVE      Ketones, Urine 20 (1+) (*)     Bilirubin, Urine NEGATIVE      Urobilinogen, Urine Normal      Nitrite, Urine NEGATIVE      Leukocyte Esterase, Urine NEGATIVE     LIPASE - Abnormal    Lipase 7 (*)     Narrative:     Venipuncture immediately after or during the administration of Metamizole may lead to falsely low results. Testing should be performed immediately prior to Metamizole dosing.   URINALYSIS MICROSCOPIC WITH REFLEX CULTURE - Abnormal    WBC, Urine NONE      RBC, Urine 6-10 (*)     Squamous Epithelial Cells, Urine 1-9 (SPARSE)      Budding Yeast, Urine PRESENT (*)     Mucus, Urine FEW     HUMAN CHORIONIC GONADOTROPIN, SERUM QUANTITATIVE - Normal    HCG, Beta-Quantitative <2      Narrative:      Total HCG measurement is performed using the [employer] Access   Immunoassay which detects intact HCG and free beta HCG subunit.    This test is not indicated for use as a tumor marker.   HCG testing is performed using a different test methodology at [facility] than other [facility]. Direct result comparison   should only be made within the same method.       LACTATE - Normal    Lactate 1.3      Narrative:     Venipuncture immediately after or during the administration of Metamizole may lead to falsely low results. Testing should be performed immediately  prior to Metamizole dosing.   SARS-COV-2 PCR - Normal    Coronavirus 2019, PCR Not Detected      Narrative:     This assay has received FDA Emergency Use Authorization (EUA) and is only authorized for the duration of time that circumstances exist to justify the authorization of the emergency use of in vitro diagnostic tests for the detection of SARS-CoV-2 virus and/or diagnosis of COVID-19 infection under section 564(b)(1) of the Act, 21 U.S.C. 360bbb-3(b)(1). This assay is an in vitro diagnostic nucleic acid amplification test for the qualitative  "detection of SARS-CoV-2 from nasopharyngeal specimens and has been validated for use at [facility]. Negative results do not preclude COVID-19 infections and should not be used as the sole basis for diagnosis, treatment, or other management decisions.     URINALYSIS WITH REFLEX CULTURE AND MICROSCOPIC    Narrative:     The following orders were created for panel order Urinalysis with Reflex Culture and Microscopic.  Procedure                               Abnormality         Status                     ---------                               -----------         ------                     Urinalysis with Reflex C...[[id number]]  Abnormal            Final result               Extra Urine Gray Tube[[id number]]                                                         Please view results for these tests on the individual orders.   URINALYSIS WITH REFLEX MICROSCOPIC     Radiographs:  CT abdomen pelvis w IV contrast   Final Result   Unremarkable abdomen and pelvis. No acute pathology.   Signed by Mushtaq Cabrera MD            Assessment   In brief, Estela Molina is a 23 y.o. female who presented to the emergency department nauseated with vomiting and abd pain/tenderness to palpation throughout. Hypoactive bowl sounds. RLQ guarding. Abd is soft. Negative mcburney's, negative bean's point tenderness.     Plan   Plan for IV fluid hydration, antiemetic, analgesics, CT abd, and lab work. Pending reeval.     Differentials   Gastroenteritis   Cholecystitis  Appendicitis  UTI  Pyelonephritis  Viral Illness    ED Course     Diagnoses as of 05/16/24 1026   Abdominal pain, generalized   Nausea and vomiting, unspecified vomiting type       Visit Vitals  BP (!) 93/49 (BP Location: Left arm, Patient Position: Sitting)   Pulse (!) 112   Temp 36.7 °C (98.1 °F) (Temporal)   Resp 18   Ht 1.397 m (4' 7\")   Wt 54.4 kg (120 lb)   SpO2 98%   Breastfeeding No   BMI 27.89 kg/m²   OB Status Implant   Smoking Status Never "   BSA 1.45 m²       Medications   dicyclomine (Bentyl) capsule 20 mg (has no administration in time range)   ondansetron (Zofran) injection 4 mg (4 mg intravenous Given 5/16/24 0748)   sodium chloride 0.9 % bolus 1,000 mL (0 mL intravenous Stopped 5/16/24 0850)   ketorolac (Toradol) injection 15 mg (15 mg intravenous Given 5/16/24 0749)   iohexol (OMNIPaque) 350 mg iodine/mL solution 75 mL (75 mL intravenous Given 5/16/24 0910)   acetaminophen (Tylenol) tablet 650 mg (650 mg oral Given 5/16/24 1017)   fluconazole (Diflucan) tablet 150 mg (150 mg oral Given 5/16/24 1017)       Plan of care discussed, with mother and patient who are in agreement.  Lab work obtained and reviewed showing mild leukocytosis with a white count of 11,500, COVID swab was negative, patient endorses some improvement of symptoms after fluid administration, nausea medication and pain medication.  Still having some pain on reevaluation and ordered Tylenol, Bentyl.  Urinalysis with budding yeast and some red blood cells, patient without any CVA tenderness, sent for CT scan, results reviewed and discussed showing no acute intra-abdominal or pelvic pathology.  Symptoms may be due to viral illness, patient does go to a  with no known specific sick contacts.  Mother does report that her appearance is improved after the fluid administration.  Heart rate was reevaluated and was 93 on reevaluation, blood pressure when repeated was 93/49 but mother states that patient is typically hypotensive.  Given strict return precautions, will be discharged home in stable condition, educated on any worsening signs and symptoms to return to the emergency department      Final Impression      1. Abdominal pain, generalized    2. Nausea and vomiting, unspecified vomiting type          DISPOSITION  Disposition: Discharge to home  Patient condition is stable    Comment: Please note this report has been produced using speech recognition software and may contain  errors related to that system including errors in grammar, punctuation, and spelling, as well as words and phrases that may be inappropriate.  If there are any questions or concerns please feel free to contact the dictating provider for clarification.    FERNY Wood APRN-CNP  05/16/24 1021

## 2024-05-17 NOTE — DISCHARGE INSTRUCTIONS
Please return to the ER or seek immediate medical attention if you experience new or worsening abdominal pain, persistent vomiting, persistent diarrhea, black tar stools, fever of 38C (100.4) or higher, chest pain, shortness of breath, or worsening of your current symptoms.    You are welcome back any time. Thank you for entrusting your care to us, I hope we made your visit as pleasant as possible. Wishing you well!    Dr. Montemayor and Dr. Yanes

## 2024-05-17 NOTE — ED PROVIDER NOTES
HPI   Chief Complaint   Patient presents with    Abdominal Pain     Pt was here this morning, seen for emesis and abd pain. Pt still endorsing abd pain, bladder pain and emesis.        23-year-old female presenting to the emergency room for evaluation of diffuse abdominal pain, nausea vomiting that began yesterday.  She was seen in this emergency room earlier in the day and discharged after being given IV fluids, Tylenol, Bentyl and Diflucan after noted to have yeast in the urine.  Otherwise she had basic abdominal laboratory studies that were gross unremarkable with a mild elevation of white blood cell count.  No sign of intra-abdominal abnormality on CT imaging.  She is not discharged home with an antiemetic but was given return precautions.  She has had persistent nausea vomiting and intermittent waves of abdominal pain causing mother to be present back to the emergency room with her.  Has a past medical history of Down syndrome, AML in remission, IgM deficiency.  No previous intra-abdominal pathology or surgeries.  Mother states that she did shower today and looked extensively in the groin with no sign of vaginal bleeding and she does not note any increased discharge.  The patient is not complain of any pelvic pain.  Does have a Mirena IUD with irregular periods as a result.  She had a negative pregnancy test earlier today.  She is having regular bowel movements without constipation or diarrhea.  She has not had any increase shortness of breath, chest pain, cough or respiratory symptoms.  No complaints of sore throat, runny nose or earache.  No flank pain complaints or back pain.  No falls or trauma.                          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  Social History     Tobacco Use    Smoking status: Never    Smokeless tobacco: Never   Vaping Use    Vaping status: Never Used   Substance Use Topics    Alcohol use: Never    Drug use: Never       Physical Exam   ED Triage Vitals [05/16/24 1926]   Temperature Heart Rate Respirations BP   37.1 °C (98.8 °F) (!) 113 16 122/65      Pulse Ox Temp src Heart Rate Source Patient Position   98 % -- -- --      BP Location FiO2 (%)     -- --       Physical Exam  Vitals and nursing note reviewed.   Constitutional:       General: She is not in acute distress.     Appearance: She is well-developed. She is not ill-appearing.   HENT:      Head: Normocephalic and atraumatic.      Nose: No congestion or rhinorrhea.      Mouth/Throat:      Mouth: Mucous membranes are moist.      Pharynx: No oropharyngeal exudate or posterior oropharyngeal erythema.   Eyes:      Conjunctiva/sclera: Conjunctivae normal.   Cardiovascular:      Rate and Rhythm: Regular rhythm. Tachycardia present.      Pulses: Normal pulses.      Heart sounds: No murmur heard.     No gallop.   Pulmonary:      Effort: Pulmonary effort is normal. No respiratory distress.      Breath sounds: Normal breath sounds. No stridor. No wheezing, rhonchi or rales.   Abdominal:      General: Bowel sounds are normal. There is no distension.      Palpations: Abdomen is soft. There is no shifting dullness, hepatomegaly or mass.      Tenderness: There is generalized abdominal tenderness (Mild). There is no right CVA tenderness, left CVA  tenderness, guarding or rebound. Negative signs include Buckley's sign and McBurney's sign.   Musculoskeletal:         General: No swelling.      Cervical back: Neck supple.   Skin:     General: Skin is warm and dry.      Capillary Refill: Capillary refill takes less than 2 seconds.      Findings: No rash.   Neurological:      General: No focal deficit present.      Mental Status: She is alert and oriented to person, place, and time.      Cranial Nerves: No cranial nerve deficit.      Sensory: No sensory deficit.      Gait: Gait normal.   Psychiatric:         Mood and Affect: Mood normal.         Behavior: Behavior normal.         Thought Content: Thought content normal.         ED Course & MDM   ED Course as of 05/16/24 2204   Thu May 16, 2024   2024 Morning CT reviewed and I do not note any acute pathology at that time.  After discussion with the patient's mother she was given oral medication this morning but immediately threw them up upon being discharged.  She has not discharged with any antiemetics and will trial oral Zofran at this time.  If she is able tolerate p.o. and keep this down as able to keep up with hydration in the emergency department mother is open to the idea possibly and is discharged with an antiemetic for home.  If she is otherwise unable to will plan for IV reinsertion, IV fluids and hospitalization.  I did give consideration for possible pelvic pathology.  She does have a Mirena IUD however has no vaginal discharge, pelvic pain, vaginal bleeding. [TL]   2045 Will obtain repeat laboratory studies to assess for any significant change.  2 L IV fluid to be given.  CBC, metabolic panel, lipase, urinalysis ordered. [TL]   2135 Hemoglobin grossly stable, leukocytosis has improved. [TL]   2153 Potassium to be repleted at this time.  No significant acidosis.  Bicarb within normal limits.  Sodium 134. [TL]   2154 Lipase remains unremarkable. [TL]      ED Course User Index  [TL] Boyd Yanes DO          Diagnoses as of 05/16/24 2204   Generalized abdominal pain       Medical Decision Making  23-year-old female presenting for nausea, vomiting and generalized abdominal discomfort.  Seems to come in waves at home.  No previous abdominal pathology.  I did review patient's CT imaging and I agree I do not see any acute abnormality on this morning's CT report.  Given inability to tolerate p.o. at home we will attempt oral Zofran at this time and p.o. challenge to see if she is able to tolerate anticipation for hopeful discharge however I am concerned that she may require hospitalization and further evaluation/IV fluids.  She does not appear to have pelvic pathology such as ovarian torsion, ectopic pregnancy or tubo-ovarian abscess.  She has no significant focal tenderness in the right upper quadrant and negative Buckley sign I do not suspect hepatobiliary process.  No cardiopulmonary findings on examination no ENT findings.  Unclear if the patient simply has a viral gastroenteritis at this time.  Will continue to monitor the patient and treat with IV fluids.  Repeat laboratory studies to be obtained at this time.      Urinalysis and repeat abdominal evaluation pending at time of signout to Dr. Montemayor.  If patient continues to not be able to tolerate p.o. I would recommend admission for IV fluids and serial abdominal exams.  Based on my evaluation I do not believe that repeat emergent imaging is indicated at this time.    Procedure  Procedures     Boyd Yanes DO  05/16/24 2205

## 2024-05-17 NOTE — PROGRESS NOTES
Emergency Medicine Transition of Care Note.    I received Estela Molina in signout from Dr. Yanes.  Please see the previous ED provider note for all HPI, PE and MDM up to the time of signout at 2215. This is in addition to the primary record.    In brief Estela Molina is an 23 y.o. female presenting for   Chief Complaint   Patient presents with   • Abdominal Pain     Pt was here this morning, seen for emesis and abd pain. Pt still endorsing abd pain, bladder pain and emesis.      At the time of signout we were awaiting: UA and PO challenge for dispo    ED Course as of 05/17/24 0237   Thu May 16, 2024   2024 Morning CT reviewed and I do not note any acute pathology at that time.  After discussion with the patient's mother she was given oral medication this morning but immediately threw them up upon being discharged.  She has not discharged with any antiemetics and will trial oral Zofran at this time.  If she is able tolerate p.o. and keep this down as able to keep up with hydration in the emergency department mother is open to the idea possibly and is discharged with an antiemetic for home.  If she is otherwise unable to will plan for IV reinsertion, IV fluids and hospitalization.  I did give consideration for possible pelvic pathology.  She does have a Mirena IUD however has no vaginal discharge, pelvic pain, vaginal bleeding. [TL]   2045 Will obtain repeat laboratory studies to assess for any significant change.  2 L IV fluid to be given.  CBC, metabolic panel, lipase, urinalysis ordered. [TL]   2135 Hemoglobin grossly stable, leukocytosis has improved. [TL]   2153 Potassium to be repleted at this time.  No significant acidosis.  Bicarb within normal limits.  Sodium 134. [TL]   2154 Lipase remains unremarkable. [TL]   2253 Microscopic Only, Urine(!)  1+ bacteria, no nitrite, no esterase, given squamous contamination will not treat [CB]   2317 Tolerated PO challenge.     Shared decision making for  disposition  Patient and/or patient´s representative was counseled regarding labs, imaging, likely diagnosis. All questions were answered. Recommendation was made   for discharge home. The patient agreed and was discharged home in stable condition with appropriate relevant educational materials. Return precautions were provided which included new or worsening abdominal pain, persistent vomiting, persistent diarrhea, black tar stools, fever of 38C (100.4) or higher, chest pain, shortness of breath, or worsening of your current symptoms..     Zofran Rx'd for home use [CB]      ED Course User Index  [CB] Darrion Villegas DO  [TL] Boyd Yanes DO         Diagnoses as of 05/17/24 0237   Generalized abdominal pain   Nausea and vomiting, unspecified vomiting type       Medical Decision Making      Final diagnoses:   [R10.84] Generalized abdominal pain   [R11.2] Nausea and vomiting, unspecified vomiting type           Procedure  Procedures    Darrion Villegas DO     Reviewed and approved by DARRION VILLEGAS on 5/16/24 at 10:15 PM.

## 2024-05-30 LAB
ATRIAL RATE: 105 BPM
P AXIS: 29 DEGREES
P OFFSET: 213 MS
P ONSET: 163 MS
PR INTERVAL: 126 MS
Q ONSET: 226 MS
QRS COUNT: 17 BEATS
QRS DURATION: 70 MS
QT INTERVAL: 330 MS
QTC CALCULATION(BAZETT): 436 MS
QTC FREDERICIA: 397 MS
R AXIS: 72 DEGREES
T AXIS: 20 DEGREES
T OFFSET: 391 MS
VENTRICULAR RATE: 105 BPM

## 2024-10-17 ENCOUNTER — LAB (OUTPATIENT)
Dept: LAB | Facility: LAB | Age: 24
End: 2024-10-17
Payer: MEDICARE

## 2024-10-17 ENCOUNTER — APPOINTMENT (OUTPATIENT)
Dept: PRIMARY CARE | Facility: CLINIC | Age: 24
End: 2024-10-17
Payer: MEDICARE

## 2024-10-17 VITALS
WEIGHT: 128 LBS | HEART RATE: 82 BPM | HEIGHT: 59 IN | BODY MASS INDEX: 25.8 KG/M2 | OXYGEN SATURATION: 98 % | SYSTOLIC BLOOD PRESSURE: 112 MMHG | DIASTOLIC BLOOD PRESSURE: 71 MMHG

## 2024-10-17 DIAGNOSIS — Z13.220 ENCOUNTER FOR LIPID SCREENING FOR CARDIOVASCULAR DISEASE: ICD-10-CM

## 2024-10-17 DIAGNOSIS — L73.2 SUPPURATIVE HIDRADENITIS: ICD-10-CM

## 2024-10-17 DIAGNOSIS — R63.5 ABNORMAL WEIGHT GAIN: ICD-10-CM

## 2024-10-17 DIAGNOSIS — Q90.9 DOWN SYNDROME: ICD-10-CM

## 2024-10-17 DIAGNOSIS — E55.9 MILD VITAMIN D DEFICIENCY: ICD-10-CM

## 2024-10-17 DIAGNOSIS — Z13.6 ENCOUNTER FOR LIPID SCREENING FOR CARDIOVASCULAR DISEASE: ICD-10-CM

## 2024-10-17 DIAGNOSIS — Z00.00 MEDICARE ANNUAL WELLNESS VISIT, SUBSEQUENT: Primary | ICD-10-CM

## 2024-10-17 DIAGNOSIS — Z23 IMMUNIZATION DUE: ICD-10-CM

## 2024-10-17 DIAGNOSIS — H60.61 CHRONIC OTITIS EXTERNA OF RIGHT EAR, UNSPECIFIED TYPE: ICD-10-CM

## 2024-10-17 PROCEDURE — 84443 ASSAY THYROID STIM HORMONE: CPT

## 2024-10-17 PROCEDURE — 99214 OFFICE O/P EST MOD 30 MIN: CPT | Performed by: STUDENT IN AN ORGANIZED HEALTH CARE EDUCATION/TRAINING PROGRAM

## 2024-10-17 PROCEDURE — 83516 IMMUNOASSAY NONANTIBODY: CPT

## 2024-10-17 PROCEDURE — 85025 COMPLETE CBC W/AUTO DIFF WBC: CPT

## 2024-10-17 PROCEDURE — G0008 ADMIN INFLUENZA VIRUS VAC: HCPCS | Performed by: STUDENT IN AN ORGANIZED HEALTH CARE EDUCATION/TRAINING PROGRAM

## 2024-10-17 PROCEDURE — 80053 COMPREHEN METABOLIC PANEL: CPT

## 2024-10-17 PROCEDURE — 1036F TOBACCO NON-USER: CPT | Performed by: STUDENT IN AN ORGANIZED HEALTH CARE EDUCATION/TRAINING PROGRAM

## 2024-10-17 PROCEDURE — 82306 VITAMIN D 25 HYDROXY: CPT

## 2024-10-17 PROCEDURE — 90656 IIV3 VACC NO PRSV 0.5 ML IM: CPT | Performed by: STUDENT IN AN ORGANIZED HEALTH CARE EDUCATION/TRAINING PROGRAM

## 2024-10-17 PROCEDURE — 80061 LIPID PANEL: CPT

## 2024-10-17 PROCEDURE — 82784 ASSAY IGA/IGD/IGG/IGM EACH: CPT

## 2024-10-17 PROCEDURE — 3008F BODY MASS INDEX DOCD: CPT | Performed by: STUDENT IN AN ORGANIZED HEALTH CARE EDUCATION/TRAINING PROGRAM

## 2024-10-17 RX ORDER — CIPROFLOXACIN AND DEXAMETHASONE 3; 1 MG/ML; MG/ML
4 SUSPENSION/ DROPS AURICULAR (OTIC) 2 TIMES DAILY
Qty: 7.5 ML | Refills: 0 | Status: SHIPPED | OUTPATIENT
Start: 2024-10-17 | End: 2024-10-24

## 2024-10-17 RX ORDER — DOXYCYCLINE 100 MG/1
100 CAPSULE ORAL DAILY
Qty: 90 CAPSULE | Refills: 0 | Status: SHIPPED | OUTPATIENT
Start: 2024-10-17 | End: 2025-01-15

## 2024-10-17 ASSESSMENT — ACTIVITIES OF DAILY LIVING (ADL)
DRESSING: NEEDS ASSISTANCE
TAKING_MEDICATION: NEEDS ASSISTANCE
DOING_HOUSEWORK: NEEDS ASSISTANCE
BATHING: NEEDS ASSISTANCE
MANAGING_FINANCES: TOTAL CARE
GROCERY_SHOPPING: NEEDS ASSISTANCE

## 2024-10-17 ASSESSMENT — ENCOUNTER SYMPTOMS
DEPRESSION: 0
LOSS OF SENSATION IN FEET: 0
OCCASIONAL FEELINGS OF UNSTEADINESS: 0

## 2024-10-17 ASSESSMENT — PATIENT HEALTH QUESTIONNAIRE - PHQ9
1. LITTLE INTEREST OR PLEASURE IN DOING THINGS: NOT AT ALL
SUM OF ALL RESPONSES TO PHQ9 QUESTIONS 1 AND 2: 0
2. FEELING DOWN, DEPRESSED OR HOPELESS: NOT AT ALL

## 2024-10-17 ASSESSMENT — PAIN SCALES - GENERAL: PAINLEVEL_OUTOF10: 1

## 2024-10-17 NOTE — PATIENT INSTRUCTIONS
Dental Contact Info:   - [facility]: [phone]  -  Dental: [phone]    Immunology referral to Dr. Gallegos    Follow up with oncology survivorship team     Labs today in [address]    CiproDex antibiotic drops for right Ear   ENT referral as well     New medication, Doxycycline 100mg daily, for her skin  Follow up with Dermatology  May consider addition of Metformin, Aldactone, etc pending response    Flu shot today   Updated COVID booster through your pharmacy    Best,  Dr. MONDRAGON

## 2024-10-17 NOTE — PROGRESS NOTES
Estela Molina is a 23 y.o. female seen in Clinic at /[facility] by Dr. Cristóbal Sierra on 06/14/23 for routine care, as well as for management of the following chronic medical conditions: Down Syndrome, AML (diagnosed 2000, treated per [id number], follows with onc survivorship at [facility]), hypoT (currently off thyroid hormone replacement), IgM deficiency (remotely seen by immunology, Dr. Quevedo). She presents today for her yearly physical. Patient is accompanied to this visit by her mother.     ACUTE CONCERNS:  #R Otitis Externa  - active drainage/purulence of R ear today on exam, similar to prior  - mild pain with manipulation   - treated with topical fluoroquinolone previously  - prior recurrent otitis; tube in childhood  [ ] ENT   [ ] Ciprodex drops    #Impacted wisdom tooth, R mandible  - has some associated pain  - discussed long wait times  [ ] Numbers for RBC and [facility] oral surgery provided      CHRONIC MEDICAL CONDITIONS:   #Down Syndrome  #HypoT (off meds for over a year, previously 37.5mcg daily)  #AML (diagnosed 2000)  #ASD/VSD (no surgical intervention)   Reassuring labs in 05/2024, mild hypoK, stable CBC. Last Lipid panel 06/2023 non-fasting, and WNL  TSH 06/2023 2.78. Low vitamin D, started on supplementation.  - follows with peds onc survivorship, peds endo   - adult down syndrome screening guidelines  - prior TTG testing negative, no GI concerns  - TSH with screening labs  - no concerns for atlanto-axial instability per ROS   [  ] echo with onc survivorship   [  ] follow up with onc survivorship - due 11/2024  [  ] labs today: CBCd, CMP, TSH, Lipid panel, Celiac panel     #IgM Deficiency   - last saw Dr. Quevedo several years ago   - Immunoglobulins 06/2023 were WNL, including IgM  [  ] Immunology referral re-placed  [  ] Pneumococcal titers, immunoglobulins ordered     #Dysmenorrhea, resolved  - s/p IUD  - Dr. Vasques, GYN  - amenorrheic now      #HS  - trialed topical clinda - not helping  - derm visit last  year  - has had drainage with general surgery in the past   [ ] Start doxy 100 daily, consider aldactone vs metformin in the future if monotherapy not helpful  [ ] FUV with derm     #Folliculitis   - topical mupirocin      #Chronic Constipation   - has been evaluated by GI in the past   - bowel regimen with good control at home currently     Past Medical History:    Past Medical History:   Diagnosis Date    Personal history of leukemia 2015    History of leukemia    Personal history of leukemia 2021    History of acute myeloid leukemia in remission    Personal history of other infectious and parasitic diseases 2020    History of viral infection    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)     Decreased immunoglobulin M     Subspecialty Medical Care: Peds ([facility])  Identified Adult Providers: Endo, GYN, Onc Survivorship, Immunology     Past Surgical History:    Past Surgical History:   Procedure Laterality Date    OTHER SURGICAL HISTORY  2021    Bone biopsy    OTHER SURGICAL HISTORY  2021    Bone marrow biopsy    OTHER SURGICAL HISTORY  2022    Ear pressure equalization tube insertion     Surgery/Location/Date:      Medications:  previously Levothyroxine 37.5mcg daily  (MVI, Vitamin D)    Current Outpatient Medications:     cholecalciferol, vitamin D3, (Vitamin D3) 25 mcg (1,000 unit) tablet,chewable, Chew 1 Units once daily., Disp: 90 tablet, Rfl: 3    clindamycin (Cleocin T) 1 % lotion, Apply topically 2 times a day., Disp: 60 mL, Rfl: 3  Pharmacy:      Social History:   Home/Living Situation: lives at home with mom, acacia, and younger brother; father  around    Education: graduated, [place]   Employment/Work/Vocational: work program, [employer]   Activities: capable of working but family feels not safe given wandering   Drug Use: non-smoker   Diet: healthy dietary habits discussed  Sexuality/Contraception/Menstrual  History: follows with GYN  Suicide/Depression/Anxiety: no mood concerns, father  around 8 years ago (will make her sad)   Sleep:      Transition Processes:  Financial/Health Insurance: Medicare  Transportation: mother   Voting: NA  Legal/Guardian: Millie Martin  Contact Information: [phone]    Visit Vitals  OB Status Implant   Smoking Status Never        PHYSICAL EXAM:    General: well appearing, NAD, pleasant and engaged in encounter    HEENT: NCAT, MMM, R ear canal erythematous with purulent discharge, TM intact  CV: RRR, no m/r/g  PULM: CTAB, non-labored respirations   ABD: soft, NT, ND, + bowel sounds   : no suprapubic or CVA tenderness   EXT: WWP, no significant edema   SKIN: scattered closed comedones on face and back, scarring in inguinal folds, no actively draining sinuses or cystic lesions   NEURO: A&Ox4, symmetric facies, no gross motor or sensory deficits, normal gait  PSYCH: pleasant mood, appropriate affect     Assessment/Plan    Estela Molina is a 23 y.o. female seen in Clinic at /[facility] by Dr. 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 Quevedo). Patient is accompanied to this visit by her mother.     ACUTE CONCERNS:  #R Otitis Externa, recurrent  - active drainage/purulence of R ear today on exam, similar to prior  - mild pain with manipulation   - treated with topical fluoroquinolone previously  - prior recurrent otitis; tube in childhood  [ ] ENT   [ ] Ciprodex drops    #Impacted wisdom tooth, R mandible  - has some associated pain  - discussed long wait times  [ ] Numbers for RBC and [facility] oral surgery provided      CHRONIC MEDICAL CONDITIONS:   #Down Syndrome  #HypoT (off meds for over a year, previously 37.5mcg  daily)  #AML (diagnosed 2000)  #ASD/VSD (no surgical intervention)   Reassuring labs in 05/2024, mild hypoK, stable CBC. Last Lipid panel 06/2023 non-fasting, and WNL  TSH 06/2023 2.78. Low vitamin D, started on supplementation.  - follows with peds onc survivorship, peds endo   - adult down syndrome screening guidelines  - prior TTG testing negative, no GI concerns  - TSH with screening labs  - no concerns for atlanto-axial instability per ROS   [  ] echo with onc survivorship   [  ] follow up with onc survivorship - due 11/2024  [  ] labs today: CBCd, CMP, TSH, Lipid panel, Celiac panel     #IgM Deficiency   - last saw Dr. Quevedo several years ago   - Immunoglobulins 06/2023 were WNL, including IgM  [  ] Immunology referral re-placed  [  ] Pneumococcal titers, immunoglobulins ordered     #Dysmenorrhea, resolved  - s/p IUD  - Dr. Vasques, GYN  - amenorrheic now      #HS  - trialed topical clinda - not helping  - derm visit last year  - has had drainage with general surgery in the past   [ ] Start doxy 100 daily, consider aldactone vs metformin in the future if monotherapy not helpful  [ ] FUV with derm     #Folliculitis   - topical mupirocin      #Chronic Constipation   - has been evaluated by GI in the past   - bowel regimen with good control at home currently     #Health Maintenance   - Vision, Hearing screens: recommend audiology, optho  - Counseling regarding alcohol/tobacco/drug use: denies  - Depression screen: no concerns at present   - BMI, Lipid, A1C screening and nutritional/exercise counseling: labs today   - Blood Pressure: WNL  - Safe Sexual Practices, STI, HIV screening: never sexually active, IUD in place  - Pap Smear (21 years and above): follows with GYN     #Transition of Care/Young Adult Care  - Health Literacy Assessment: mother excellent advocate for her daughter   - Medications: Active medications reviewed and updated  - Allergies: Reviewed and updated   - Immunizations: Tdap 2014 (due 2024),  likely PCV/PPSV needed; COVID primary series (no booster), HPV vaccine series complete, flu annually recommended   - Identified Adult or Subspecialty Providers: Endo, GYN, Onc Survivorship, Immunology   - Resources Provided: Immunology, ENT referrals, Lab screening today, Adult Down Syndrome Checklist (Global Medical Care Guidelines)   - Health Passport: Reviewed resource for relevant patients ([email or web address])  - Healthcare POA: Mother Contact Info: [phone] ([email or web address])      Lina Lara MD  Internal Medicine and Pediatrics, PGY-2  [place]      Cristóbal Sierra MD  Internal Medicine-Pediatrics   [facility] [address], [place], [address]  P: [phone], F: [phone]

## 2024-10-18 LAB
25(OH)D3 SERPL-MCNC: 33 NG/ML (ref 30–100)
ALBUMIN SERPL BCP-MCNC: 4.7 G/DL (ref 3.4–5)
ALP SERPL-CCNC: 56 U/L (ref 33–110)
ALT SERPL W P-5'-P-CCNC: 15 U/L (ref 7–45)
ANION GAP SERPL CALC-SCNC: 15 MMOL/L (ref 10–20)
AST SERPL W P-5'-P-CCNC: 15 U/L (ref 9–39)
BASOPHILS # BLD AUTO: 0.08 X10*3/UL (ref 0–0.1)
BASOPHILS NFR BLD AUTO: 1.5 %
BILIRUB SERPL-MCNC: 0.6 MG/DL (ref 0–1.2)
BUN SERPL-MCNC: 14 MG/DL (ref 6–23)
CALCIUM SERPL-MCNC: 9.3 MG/DL (ref 8.6–10.6)
CHLORIDE SERPL-SCNC: 102 MMOL/L (ref 98–107)
CHOLEST SERPL-MCNC: 188 MG/DL (ref 0–199)
CHOLESTEROL/HDL RATIO: 4.2
CO2 SERPL-SCNC: 26 MMOL/L (ref 21–32)
CREAT SERPL-MCNC: 0.73 MG/DL (ref 0.5–1.05)
EGFRCR SERPLBLD CKD-EPI 2021: >90 ML/MIN/1.73M*2
EOSINOPHIL # BLD AUTO: 0.03 X10*3/UL (ref 0–0.7)
EOSINOPHIL NFR BLD AUTO: 0.6 %
ERYTHROCYTE [DISTWIDTH] IN BLOOD BY AUTOMATED COUNT: 13.6 % (ref 11.5–14.5)
GLIADIN PEPTIDE IGA SER IA-ACNC: 1.4 U/ML
GLUCOSE SERPL-MCNC: 82 MG/DL (ref 74–99)
HCT VFR BLD AUTO: 40.7 % (ref 36–46)
HDLC SERPL-MCNC: 45.2 MG/DL
HGB BLD-MCNC: 13.3 G/DL (ref 12–16)
IGA SERPL-MCNC: 279 MG/DL (ref 70–400)
IGG SERPL-MCNC: 990 MG/DL (ref 700–1600)
IGM SERPL-MCNC: 54 MG/DL (ref 40–230)
IMM GRANULOCYTES # BLD AUTO: 0.02 X10*3/UL (ref 0–0.7)
IMM GRANULOCYTES NFR BLD AUTO: 0.4 % (ref 0–0.9)
LDLC SERPL CALC-MCNC: 132 MG/DL
LYMPHOCYTES # BLD AUTO: 1.05 X10*3/UL (ref 1.2–4.8)
LYMPHOCYTES NFR BLD AUTO: 19.6 %
MCH RBC QN AUTO: 32.6 PG (ref 26–34)
MCHC RBC AUTO-ENTMCNC: 32.7 G/DL (ref 32–36)
MCV RBC AUTO: 100 FL (ref 80–100)
MONOCYTES # BLD AUTO: 0.24 X10*3/UL (ref 0.1–1)
MONOCYTES NFR BLD AUTO: 4.5 %
NEUTROPHILS # BLD AUTO: 3.94 X10*3/UL (ref 1.2–7.7)
NEUTROPHILS NFR BLD AUTO: 73.4 %
NON HDL CHOLESTEROL: 143 MG/DL (ref 0–149)
NRBC BLD-RTO: 0 /100 WBCS (ref 0–0)
PLATELET # BLD AUTO: 378 X10*3/UL (ref 150–450)
POTASSIUM SERPL-SCNC: 4.2 MMOL/L (ref 3.5–5.3)
PROT SERPL-MCNC: 7.1 G/DL (ref 6.4–8.2)
RBC # BLD AUTO: 4.08 X10*6/UL (ref 4–5.2)
SODIUM SERPL-SCNC: 139 MMOL/L (ref 136–145)
TRIGL SERPL-MCNC: 53 MG/DL (ref 0–149)
TSH SERPL-ACNC: 2.03 MIU/L (ref 0.44–3.98)
TTG IGA SER IA-ACNC: <1 U/ML
VLDL: 11 MG/DL (ref 0–40)
WBC # BLD AUTO: 5.4 X10*3/UL (ref 4.4–11.3)

## 2024-10-19 LAB
GLIADIN PEPTIDE IGG SER IA-ACNC: <0.56 FLU (ref 0–4.99)
TTG IGG SER IA-ACNC: <0.82 FLU (ref 0–4.99)

## 2024-10-20 LAB
S PN DA SERO 19F IGG SER-MCNC: 0.33 UG/ML
S PNEUM DA 1 IGG SER-MCNC: 0.32 UG/ML
S PNEUM DA 10A IGG SER-MCNC: 0.46 UG/ML
S PNEUM DA 11A IGG SER-MCNC: 0.05 UG/ML
S PNEUM DA 12F IGG SER-MCNC: <0.04 UG/ML
S PNEUM DA 14 IGG SER-MCNC: 0.56 UG/ML
S PNEUM DA 15B IGG SER-MCNC: <0.1 UG/ML
S PNEUM DA 17F IGG SER-MCNC: 0.14 UG/ML
S PNEUM DA 18C IGG SER-MCNC: 0.05 UG/ML
S PNEUM DA 19A IGG SER-MCNC: 0.37 UG/ML
S PNEUM DA 2 IGG SER-MCNC: <0.09 UG/ML
S PNEUM DA 20A IGG SER-MCNC: 0.68 UG/ML
S PNEUM DA 22F IGG SER-MCNC: 0.04 UG/ML
S PNEUM DA 23F IGG SER-MCNC: 0.05 UG/ML
S PNEUM DA 3 IGG SER-MCNC: 0.27 UG/ML
S PNEUM DA 33F IGG SER-MCNC: 0.08 UG/ML
S PNEUM DA 4 IGG SER-MCNC: 0.11 UG/ML
S PNEUM DA 5 IGG SER-MCNC: 0.53 UG/ML
S PNEUM DA 6B IGG SER-MCNC: 0.04 UG/ML
S PNEUM DA 7F IGG SER-MCNC: 0.08 UG/ML
S PNEUM DA 8 IGG SER-MCNC: 0.16 UG/ML
S PNEUM DA 9N IGG SER-MCNC: <0.05 UG/ML
S PNEUM DA 9V IGG SER-MCNC: 0.11 UG/ML
S PNEUM SEROTYPE IGG SER-IMP: NORMAL

## 2024-11-07 ENCOUNTER — APPOINTMENT (OUTPATIENT)
Dept: OTOLARYNGOLOGY | Facility: CLINIC | Age: 24
End: 2024-11-07
Payer: COMMERCIAL

## 2024-11-12 ENCOUNTER — APPOINTMENT (OUTPATIENT)
Dept: HEMATOLOGY/ONCOLOGY | Facility: HOSPITAL | Age: 24
End: 2024-11-12
Payer: MEDICARE

## 2024-11-15 ENCOUNTER — APPOINTMENT (OUTPATIENT)
Dept: HEMATOLOGY/ONCOLOGY | Facility: HOSPITAL | Age: 24
End: 2024-11-15
Payer: MEDICARE

## 2024-11-19 ENCOUNTER — PATIENT OUTREACH (OUTPATIENT)
Dept: HEMATOLOGY/ONCOLOGY | Facility: HOSPITAL | Age: 24
End: 2024-11-19
Payer: COMMERCIAL

## 2024-11-19 NOTE — PROGRESS NOTES
RN called and left a message on the patient's mother's voicemail with directions for visit on Friday with Dr. Mckeon. The patient was referred for history of AML. RN left call back number if her mother has questions prior to the appointment.

## 2024-11-22 ENCOUNTER — OFFICE VISIT (OUTPATIENT)
Dept: HEMATOLOGY/ONCOLOGY | Facility: HOSPITAL | Age: 24
End: 2024-11-22
Payer: MEDICARE

## 2024-11-22 VITALS
HEART RATE: 90 BPM | BODY MASS INDEX: 27.37 KG/M2 | OXYGEN SATURATION: 97 % | WEIGHT: 135.5 LBS | RESPIRATION RATE: 16 BRPM | TEMPERATURE: 96.8 F | DIASTOLIC BLOOD PRESSURE: 57 MMHG | SYSTOLIC BLOOD PRESSURE: 100 MMHG

## 2024-11-22 DIAGNOSIS — C92.00 AML (ACUTE MYELOBLASTIC LEUKEMIA) (MULTI): ICD-10-CM

## 2024-11-22 DIAGNOSIS — I42.7 CHEMOTHERAPY INDUCED CARDIOMYOPATHY (MULTI): ICD-10-CM

## 2024-11-22 DIAGNOSIS — T45.1X5A CHEMOTHERAPY INDUCED CARDIOMYOPATHY (MULTI): ICD-10-CM

## 2024-11-22 PROCEDURE — 99205 OFFICE O/P NEW HI 60 MIN: CPT | Performed by: STUDENT IN AN ORGANIZED HEALTH CARE EDUCATION/TRAINING PROGRAM

## 2024-11-22 PROCEDURE — 99215 OFFICE O/P EST HI 40 MIN: CPT | Performed by: STUDENT IN AN ORGANIZED HEALTH CARE EDUCATION/TRAINING PROGRAM

## 2024-11-22 ASSESSMENT — PAIN SCALES - GENERAL: PAINLEVEL_OUTOF10: 0-NO PAIN

## 2024-11-22 NOTE — PROGRESS NOTES
"Patient ID: Estela Molina is a 24 y.o. female.  Referring Physician: Cristóbal Sierra MD  [address]  [facility], [facility], [place]  [place] [address]  Primary Care Provider: Cristóbal Sierra MD    Oncology History Overview Note   AML:  - Dx at age 2 (2002)  - Treated on clinical trial [id number].  Per trial site:    \"Patients receive induction therapy comprising cytarabine IV continuously, daunorubicin hydrochloride IV continuously, and oral thioguanine twice daily on days 0-3. Treatment repeats every 28 days for 4 courses. Patients with no CNS disease at diagnosis receive cytarabine intrathecally (IT) on day 0. Patients with CNS disease at diagnosis receive cytarabine IT on days 0, 5, and 7. If CNS disease persists on day 7, patients receive up to 6 courses of cytarabine IT, therapeutic hydrocortisone IT, and methotrexate IT, twice weekly beginning on day 10. Asparaginase during Intensification 1 day 1 hour 18.\"    Surveillance:  - Pt has remained in remission since EOT  - Was followed at [place], lost to follow up during/after COVID     AML (acute myeloid leukemia) (Multi)   11/24/2024 Initial Diagnosis    AML (acute myeloid leukemia) (Multi)            Subjective    Estela is a delightful 25 yo F with Down's Syndrome and h/o AML dx at age 2 treated on clinical trial and has remained in remission ever since.  She presents today with her mother to establish care with adult oncology.    Her mother reports that she gets frequent minor illnesses, such as ear infections, colds, PNA, bronchitis; she had a stomach virus this past summer.    She has a permanent hole in R TM (from old tube), and continues to follow with ENT.    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    She has no complaints or questions today.          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         Objective   BSA: 1.6 meters squared  /57 (BP Location: Left arm, Patient Position: Sitting, BP Cuff Size: Adult)   Pulse 90   Temp 36 °C (96.8 °F) (Skin)   Resp 16   Wt 61.5 kg (135 lb 8 oz)   SpO2 97%   BMI 27.37 kg/m²       Estela Molina  reports that she has never smoked. She has never used smokeless tobacco.  She  reports no history of alcohol use.  She  reports no history of drug use.    Physical Exam  Vitals reviewed.   Constitutional:       Comments: Short stature   HENT:      Head: Normocephalic and atraumatic.      Right Ear: External ear normal.      Left Ear: External ear normal.      Nose: Nose normal.      Mouth/Throat:      Mouth: Mucous membranes are moist.      Pharynx: Oropharynx is clear.   Eyes:      Extraocular Movements: Extraocular movements intact.      Conjunctiva/sclera: Conjunctivae normal.      Pupils: Pupils are equal, round, and reactive to light.      Comments: Epicanthal folds   Cardiovascular:      Rate and Rhythm: Normal rate and regular rhythm.   Pulmonary:      Effort: Pulmonary effort is normal.      Breath sounds: Normal breath sounds.   Abdominal:      Palpations: Abdomen is soft.      Tenderness: There is no abdominal tenderness.   Musculoskeletal:         General: Normal range of motion.      Cervical back: Normal range of motion.   Skin:     General: Skin is warm and dry.      Comments: Abrasions at base of nails from picking   Neurological:      General: No focal deficit present.      Mental Status: She is alert.   Psychiatric:         Mood and Affect: Mood normal.         Behavior: Behavior normal.         Thought Content: Thought content normal.         Performance Status:  Asymptomatic    Labs from  10/17/24 reviewed; CBC and comp unremarkable, cholesterol is elevated, IgGAM normal, pneumococcal titers mostly below the level of protection.  TSH normal.  Vitamin D normal.      Assessment/Plan      Estela presents today to establish adult oncology care.  I will review her records from [facility] and determine appropriate survivorship screening recommendations.  In the short term, she will need to establish with onco-cardiology due to her history of anthracycline exposure.    No acute issues or actionable lab results from an oncology perspective.     We will plan for yearly follow up but I will be in touch with her and her mother if she needs additional survivorship care before then.      Poonam Mckeon MD PhD

## 2024-11-22 NOTE — PATIENT INSTRUCTIONS
When you see immunology, please ask them whether you need a booster shot for pneumonia (or any other booster shots).  I would also let them know about the frequent infections.

## 2024-11-24 PROBLEM — C92.00 AML (ACUTE MYELOID LEUKEMIA) (MULTI): Status: ACTIVE | Noted: 2024-11-24

## 2024-11-24 ASSESSMENT — ENCOUNTER SYMPTOMS: CONSTITUTIONAL NEGATIVE: 1

## 2024-12-04 ENCOUNTER — SOCIAL WORK (OUTPATIENT)
Dept: HEMATOLOGY/ONCOLOGY | Facility: HOSPITAL | Age: 24
End: 2024-12-04
Payer: COMMERCIAL

## 2024-12-04 NOTE — PROGRESS NOTES
SW attempted to reach patient following her NPV on 11/22/24 to provide SW outreach, explain SW scope of services, identify any initial psychosocial needs. Received patient's voicemail. Left voicemail requesting callback to offer support. Awaiting callback.

## 2024-12-16 ENCOUNTER — HOSPITAL ENCOUNTER (OUTPATIENT)
Dept: CARDIOLOGY | Facility: CLINIC | Age: 24
Discharge: HOME | End: 2024-12-16
Payer: MEDICARE

## 2024-12-16 DIAGNOSIS — T45.1X5A CHEMOTHERAPY INDUCED CARDIOMYOPATHY (MULTI): ICD-10-CM

## 2024-12-16 DIAGNOSIS — I42.7 CHEMOTHERAPY INDUCED CARDIOMYOPATHY (MULTI): ICD-10-CM

## 2024-12-16 DIAGNOSIS — Z51.81 ENCOUNTER FOR THERAPEUTIC DRUG LEVEL MONITORING: ICD-10-CM

## 2024-12-16 DIAGNOSIS — C92.00 AML (ACUTE MYELOBLASTIC LEUKEMIA) (MULTI): ICD-10-CM

## 2024-12-16 LAB
AORTIC VALVE MEAN GRADIENT: 2 MMHG
AORTIC VALVE PEAK VELOCITY: 0.84 M/S
AV PEAK GRADIENT: 3 MMHG
AVA (PEAK VEL): 3.04 CM2
AVA (VTI): 3.34 CM2
EJECTION FRACTION APICAL 4 CHAMBER: 63.1
EJECTION FRACTION: 61 %
GLOBAL LONGITUDINAL STRAIN: 19.4 %
LEFT ATRIUM VOLUME AREA LENGTH INDEX BSA: 16.7 ML/M2
LEFT VENTRICLE INTERNAL DIMENSION DIASTOLE: 3.9 CM (ref 3.5–6)
LEFT VENTRICULAR OUTFLOW TRACT DIAMETER: 1.94 CM
MITRAL VALVE E/A RATIO: 2.46
RIGHT VENTRICLE FREE WALL PEAK S': 12 CM/S
TRICUSPID ANNULAR PLANE SYSTOLIC EXCURSION: 2.5 CM

## 2024-12-16 PROCEDURE — 93306 TTE W/DOPPLER COMPLETE: CPT | Performed by: INTERNAL MEDICINE

## 2024-12-16 PROCEDURE — 76376 3D RENDER W/INTRP POSTPROCES: CPT | Performed by: INTERNAL MEDICINE

## 2024-12-16 PROCEDURE — 93356 MYOCRD STRAIN IMG SPCKL TRCK: CPT | Performed by: INTERNAL MEDICINE

## 2024-12-16 PROCEDURE — 76376 3D RENDER W/INTRP POSTPROCES: CPT

## 2024-12-17 ENCOUNTER — APPOINTMENT (OUTPATIENT)
Dept: OTOLARYNGOLOGY | Facility: CLINIC | Age: 24
End: 2024-12-17
Payer: COMMERCIAL

## 2024-12-17 ENCOUNTER — CLINICAL SUPPORT (OUTPATIENT)
Dept: AUDIOLOGY | Facility: CLINIC | Age: 24
End: 2024-12-17
Payer: MEDICARE

## 2024-12-17 VITALS
WEIGHT: 135 LBS | SYSTOLIC BLOOD PRESSURE: 91 MMHG | HEART RATE: 79 BPM | DIASTOLIC BLOOD PRESSURE: 65 MMHG | BODY MASS INDEX: 27.21 KG/M2 | OXYGEN SATURATION: 98 % | HEIGHT: 59 IN

## 2024-12-17 DIAGNOSIS — H72.91 TYMPANIC MEMBRANE PERFORATION, RIGHT: ICD-10-CM

## 2024-12-17 DIAGNOSIS — S00.412A EAR CANAL ABRASION, LEFT, INITIAL ENCOUNTER: ICD-10-CM

## 2024-12-17 DIAGNOSIS — H61.23 BILATERAL IMPACTED CERUMEN: ICD-10-CM

## 2024-12-17 DIAGNOSIS — H90.11 CONDUCTIVE HEARING LOSS OF RIGHT EAR WITH UNRESTRICTED HEARING OF LEFT EAR: Primary | ICD-10-CM

## 2024-12-17 PROCEDURE — 99203 OFFICE O/P NEW LOW 30 MIN: CPT

## 2024-12-17 PROCEDURE — 3008F BODY MASS INDEX DOCD: CPT

## 2024-12-17 PROCEDURE — 92550 TYMPANOMETRY & REFLEX THRESH: CPT | Performed by: AUDIOLOGIST

## 2024-12-17 PROCEDURE — 1036F TOBACCO NON-USER: CPT

## 2024-12-17 PROCEDURE — 69210 REMOVE IMPACTED EAR WAX UNI: CPT

## 2024-12-17 PROCEDURE — 92557 COMPREHENSIVE HEARING TEST: CPT | Performed by: AUDIOLOGIST

## 2024-12-17 RX ORDER — CIPROFLOXACIN AND DEXAMETHASONE 3; 1 MG/ML; MG/ML
4 SUSPENSION/ DROPS AURICULAR (OTIC) 2 TIMES DAILY
Qty: 7.5 ML | Refills: 0 | Status: SHIPPED | OUTPATIENT
Start: 2024-12-17 | End: 2024-12-24

## 2024-12-17 NOTE — PROGRESS NOTES
Patient ID: Estela Molina is a 24 y.o. female who presents for the evaluation of hearing. Patient is accompanied to the visit today by her mother.    PROVIDER IMPRESSIONS:  DIAGNOSES/PROBLEMS:  -Right tympanic membrane perforation  -Conductive hearing loss in the right ear, unrestricted hearing in the left ear  -Bilateral cerumen impaction  -Left ear canal abrasion, initial encounter    ASSESSMENT:   Estela Molina is a pleasant 24 y.o. female with a history of Down syndrome, chronic ETD s/p bilateral PE tubes x 1, and chronic right TM perforation who presents for initial hearing evaluation. Based on the clinical information provided, symptoms and clinical exam findings are consistent with bilateral cerumen impaction, right conductive hearing loss, left superficial EAC abrasion, and right TM perforation. Using appropriate instrumentation, impacted cerumen was successfully removed from the EAC bilaterally. Post-procedure exam revealed a small right anterosuperior TM perforation and a small left superficial canal wall abrasion on the inferior aspect of the left EAC. Reassurance provided to patient that exam today showed no evidence of acute infection or inflammation in the EAC bilaterally and that TM appears with no evidence of infection, effusion, or retraction bilaterally. Audiogram reviewed in detail with the patient, which revealed moderate rising to mild conductive hearing loss with type B tympanogram in the right ear, and unrestricted hearing in the left ear.     PLAN:  I recommended Ciprodex drops with 4 drops in the left EAC twice daily for 7 days for left superficial ear canal wall abrasion. Patient was counseled on the indications, possible side effects, and proper administration of medication. Prescription was e-submitted to the patient's preferred pharmacy.   I recommended patient adherence to the following Dry ear Precautions: Avoid Swimming or hot tubs. If you must swim, please use silicone ear  plugs or rubber swim caps and make sure no water gets in the ears. Use cotton ball covered with Vaseline before showering. Apply Vaseline on cotton ball and place it just at the opening of ear canal to create a water seal. Do not push the cotton ball all the way in the canal. After shower, remove the cotton ball and wipe off excess Vaseline using dry clean cloth. Then, use hair dryer on warm or cool air and hold it out 12 inches away from the affected ear and air dry ears for 30 seconds.  I recommended hearing aid amplification in the right ear. Patient advised to schedule a hearing aid evaluation with audiology. Patient's mother informed to contact health insurance to determine hearing aid coverage benefits.   Follow-up: Patient may schedule for follow-up in 7-8 months for routine removal of impacted cerumen. They may follow-up sooner, if needed. Patient is agreeable to this plan, all questions were answered to patient's satisfaction.     Subjective   HPI: Estela Molina is a 24 y.o. female with a history of Down syndrome and leukemia who presents for the evaluation of hearing loss and to reestablish care. Patient's mother has noticed that the patient has been speaking more loudly lately and has had increased difficulty hearing others. When asked about the presence of ear pain, ear fullness/pressure, tinnitus, ear itching, ear drainage, autophony, dizziness or vertigo, she admits to none. When asked about pertinent otologic history, the patient reports a history of recurrent ear infection and reports history of chronic ETD s/p bilateral PE tube insertion x 1 in childhood. Her mother mentions that patient's right PE tube remained in place for approximately 9 yrs, and left PE tube remained in place for approximately 5-6 yrs. Denies history of ear surgery. Denies history of prolonged/traumatic loud noise exposure. The patient does not insert Q-tips in the ear canals, and  denies insertion of other foreign objects  into the ear canals. The patient denies history of wearing hearing aid devices. The patient does not endorse a family history of hearing loss.       PATIENT HISTORY:  Past Medical History:   Diagnosis Date    Personal history of leukemia 01/22/2015    History of leukemia    Personal history of leukemia 08/23/2021    History of acute myeloid leukemia in remission    Personal history of other infectious and parasitic diseases 11/05/2020    History of viral infection    Selective deficiency of immunoglobulin m (igm)     Decreased immunoglobulin M      Past Surgical History:   Procedure Laterality Date    OTHER SURGICAL HISTORY  08/23/2021    Bone biopsy    OTHER SURGICAL HISTORY  08/23/2021    Bone marrow biopsy    OTHER SURGICAL HISTORY  04/21/2022    Ear pressure equalization tube insertion      No Known Allergies     Current Outpatient Medications:     cholecalciferol, vitamin D3, (Vitamin D3) 25 mcg (1,000 unit) tablet,chewable, Chew 1 Units once daily., Disp: 90 tablet, Rfl: 3    clindamycin (Cleocin T) 1 % lotion, Apply topically 2 times a day., Disp: 60 mL, Rfl: 3    doxycycline (Vibramycin) 100 mg capsule, Take 1 capsule (100 mg) by mouth once daily. Take with at least 8 ounces (large glass) of water, do not lie down for 30 minutes after, Disp: 90 capsule, Rfl: 0   Tobacco Use: Low Risk  (10/17/2024)    Patient History     Smoking Tobacco Use: Never     Smokeless Tobacco Use: Never     Passive Exposure: Not on file      Alcohol Use: Not on file      Social History     Substance and Sexual Activity   Drug Use Never        Review of Systems   All other systems negative.     Objective   Visit Vitals  OB Status Implant   Smoking Status Never        PHYSICAL EXAM:  General appearance: Appears well, well-nourished, well groomed. No acute distress.   Constitutional: No fever, chills, weight loss or weight gain.  Communication: Normal communication  Psychiatric: Oriented to person, place and time. Normal mood and  affect.  Neurologic: Cranial nerves II-XII grossly intact and symmetric bilaterally.  Cardiovascular: Examination of peripheral vascular system shows no clubbing or cyanosis.  Respiratory: No respiratory distress increased work of breathing. Inspection of the chest with symmetric chest expansion and normal respiratory effort.  Skin: No head and neck rashes.  Head: Normocephalic. Atraumatic with no masses, lesions or scarring.  Face: Normal symmetry. No scars or deformities.  Eyes: Conjunctiva not edematous or erythematous. PERRLA  Neck: Supple and symmetric, trachea midline. Lymph nodes with no adenopathy.  Head: Normocephalic. Atraumatic with no masses, lesions or scarring.  Eyes: PERRL, EOMI, Conjunctiva is clear. No nystagmus.  Nose: External inspection of nose: No nasal lesions, lacerations or scars.   Throat:  Floor of mouth is clear, no masses.  Tongue appears normal, no lesions or masses. Gums, gingiva, buccal mucosa appear pink and moist, no lesions. Teeth are in intact.  No obvious dental infections.   Oropharynx: No lesions.   Right Ear: External inspection of ear with no deformity, scars, or masses. Mastoid is nontender. External auditory canal is partially impacted with cerumen. Unable to visualize TM.   Left Ear: External inspection of ear with no deformity, scars, or masses. Mastoid is nontender. External auditory canal is completely impacted with cerumen.     EAR CLEANING PROCEDURE NOTE:  Indication: Cerumen impaction  Location: bilateral ear canals  Procedure Note: The procedure was performed by the provider.  Visualization Instrument: A microscope with a #3 speculum was placed in the ear canals to visualize the ear canal debris.  Ear Cleaning Instrument and Outcome: Using the 5/7 suction and alligator forceps, a large amount of dry, brown cerumen was removed from the impacted EAC(s).  Patient Status: The patient tolerated the procedure well.  Complications: There were no  complications.  Post-Procedure/Microscopic Otologic Exam:  Right Ear--EAC is clear. TM with 30% perforation at the anterosuperior aspect. No sign of active drainage, bleeding, or infection.   Left Ear-- EAC is clear with small white granulation tissue and scant bleeding at the inferior/lateral aspect of the canal. TM is intact with no sign of infection, effusion, or retraction.  No perforation seen. Auto insufflation visible under microscopy.    RESULTS:  I personally reviewed the patient's audiogram from  12/17/24 , which revealed the following results: Right ear with a moderate conductive hearing loss at 500 Hz, rising to a mild conductive hearing loss above. Left ear with normal hearing in frequencies from 500-4000 Hz. Right ear with Type B tympanogram, and left ear with normal tympanogram. Right ear with 96% WRS at 65 dB, and left ear with 100% WRS at 50 dB. Acoustic reflexes absent right ipsilateral, and present left ipsilateral. Right ear DPOAEs absent/referred in all frequencies, and left ear DPOAEs present from 7311-9862 Hz.     Steph Cotter, APRN-CNP

## 2024-12-17 NOTE — PROGRESS NOTES
AUDIOLOGY ADULT AUDIOMETRIC EVALUATION      Name:  Estela Molina  :  2000  Age:  24 y.o.  Date of Evaluation: 24    History:  Reason for visit:  Ms. Estela Molina was seen today as part of the visit with FERNY White for an evaluation of hearing.   The patient was accompanied by her mother.  Chief Complaint   Patient presents with    Hearing Problem     TM perforation      Reported a history of chronic ear infections with bilateral PE tube placement around the age of 18 months. Stated the left tube come out without any problems, however, the right tube remained for many years. Her mother stated after the right tube comae out, a permanent hole remained in the right tympanic membrane.   The patient stated at this time she does not have any major concerns for hearing loss at this time.   Mentioned she has noticed some occasional tinnitus, with a sound of feeling like there is water in the head a times.  The patient has a history of Downs Syndrome.   Reported an ear infection approximately one month ago, but no recent ear infections.   Denied any current otalgia, dizziness/vertigo, recent falls, s ear drainage, or sudden hearing loss.    EVALUATION     See Audiogram    RESULTS:    Otoscopic Evaluation:   Right Ear: Otoscopy revealed a clear healthy canal and a healthy tympanic membrane was visualized. Possible perforation visualized.  Left Ear: Otoscopy revealed deep occluding cerumen and the tympanic membrane was unable to be clearly visualized.     Immittance:  Immittance Measures: 226 Hz   Right Ear:   Left Ear:     Right Ear: Ipsilateral acoustic reflexes were absent at, 500-4,000 Hz. Results are consistent with the test results.   Left Ear: Ipsilateral acoustic reflexes were present at, 500-4,000 Hz, at expected sensation levels.    Test technique:  Pure Tone Audiometry via insert earphones    Reliability:   fair    Pure Tone Audiometry:    Right Ear: Audiometric testing indicated   a moderate conductive hearing loss at 500 Hz, rising to a mild conductive hearing loss above.   Left Ear:   Audiometric testing indicated normal peripheral hearing sensitivity from 500-4,000 Hz.   Note, additional frequencies not tested due to patient attention.       Speech Audiometry:   Right Ear:  Speech Reception Threshold (SRT) was obtained at 25 dBHL                  Word Recognition scores were excellent (96%) in quiet when words were presented at 65 dBHL  Left Ear:  Speech Reception Threshold (SRT) was obtained at  10 dBHL                  Word Recognition scores were excellent (100%) in quiet when words were presented at 50 dBHL  Testing was performed with recorded NU-6 speech words in quiet. Speech thresholds were in good agreement with the pure tone averages in each ear.     Distortion Product Otoacoustic Emissions:        Right Ear: Referred at all frequencies.        Left Ear:  Passing responses at 3,000-5,000 Hz.   Present OAEs suggest normal cochlear outer hair cell function.  Absent OAEs are consistent with some degree of hearing loss. Note absence responses may be due to the current middle ear status.     IMPRESSIONS:  Today's test results are hearing loss requiring medical/otologic and audiologic follow-up.  The patient was counseled with regard to the findings.    RECOMMENDATIONS:  * Continue medical follow up with FERNY White.  * Retest as medically indicated, or sooner if a change in hearing sensitivity is noticed.   * Wear hearing protection while in the presence of loud sounds.   * Pending medical management and patient desire, consider returning for a hearing aid evaluation to discuss amplification options and communication needs. The patient was instructed to call their insurance to check for any hearing aid benefits and to determine if [facility] was in network for hearing aids.   * Use effective communication strategies such as asking the speaker to gain  attention prior to speaking, speaking in the same room, repeating words that were heard, etc.    PATIENT EDUCATION:   Discussed results and recommendations with the patient and a copy of the hearing test was provided.  Questions were addressed and the patient was encouraged to contact our department should concerns arise.  The patient was seen from 8:30-9:00 am.

## 2024-12-17 NOTE — PATIENT INSTRUCTIONS
Right side Dry ear Precautions: Avoid Swimming or hot tubs with head submersion underwater. If you must swim, please use silicone ear plugs or rubber swim caps and make sure no water gets in the ears. Use cotton ball covered with Vaseline before showering. Apply Vaseline on cotton ball and place it just at the opening of ear canal to create a water seal. Do not push the cotton ball all the way in the canal. After shower, remove the cotton ball and wipe off excess Vaseline using dry clean cloth. Then, use hair dryer on warm or cool air and hold it out 12 inches away from the affected ear and air dry ears for 30 seconds.

## 2024-12-18 ENCOUNTER — OFFICE VISIT (OUTPATIENT)
Dept: CARDIOLOGY | Facility: HOSPITAL | Age: 24
End: 2024-12-18
Payer: MEDICARE

## 2024-12-18 VITALS
DIASTOLIC BLOOD PRESSURE: 56 MMHG | HEART RATE: 80 BPM | SYSTOLIC BLOOD PRESSURE: 108 MMHG | RESPIRATION RATE: 22 BRPM | BODY MASS INDEX: 27.43 KG/M2 | OXYGEN SATURATION: 100 % | WEIGHT: 135.8 LBS | TEMPERATURE: 99 F

## 2024-12-18 DIAGNOSIS — E78.00 ELEVATED LDL CHOLESTEROL LEVEL: ICD-10-CM

## 2024-12-18 DIAGNOSIS — C92.00 AML (ACUTE MYELOBLASTIC LEUKEMIA) (MULTI): ICD-10-CM

## 2024-12-18 DIAGNOSIS — Z87.74 HX OF VENTRICULAR SEPTAL DEFECT: ICD-10-CM

## 2024-12-18 DIAGNOSIS — Z92.21 STATUS POST ADMINISTRATION OF CARDIOTOXIC CHEMOTHERAPY: Primary | ICD-10-CM

## 2024-12-18 DIAGNOSIS — Z87.74 HX OF ATRIAL SEPTAL DEFECT: ICD-10-CM

## 2024-12-18 DIAGNOSIS — C92.01 ACUTE MYELOID LEUKEMIA IN REMISSION (MULTI): ICD-10-CM

## 2024-12-18 PROCEDURE — 99214 OFFICE O/P EST MOD 30 MIN: CPT | Performed by: INTERNAL MEDICINE

## 2024-12-18 PROCEDURE — 99204 OFFICE O/P NEW MOD 45 MIN: CPT | Performed by: INTERNAL MEDICINE

## 2024-12-18 PROCEDURE — 1036F TOBACCO NON-USER: CPT | Performed by: INTERNAL MEDICINE

## 2024-12-18 ASSESSMENT — ENCOUNTER SYMPTOMS
DIZZINESS: 0
CONSTIPATION: 1
ACTIVITY CHANGE: 0
CHILLS: 0
FATIGUE: 0
CARDIOVASCULAR NEGATIVE: 1
FEVER: 0
SHORTNESS OF BREATH: 0
LIGHT-HEADEDNESS: 0

## 2024-12-18 ASSESSMENT — PAIN SCALES - GENERAL: PAINLEVEL_OUTOF10: 0-NO PAIN

## 2024-12-18 NOTE — PROGRESS NOTES
CARDIOLOGY NEW PATIENT OFFICE VISIT    Patient:  Estela Molina  YOB: 2000  MRN: [id number]       Chief Complaint/Active Symptoms:       Estela Molina is a 24 y.o. female who is being seen today at the request of Dr. Mckeon  for evaluation of hx of cardiotoxic drug therapy. .    Chief Complaint   Patient presents with    New Patient Visit       History of Present Illness:   HPI    Asked to see patient due to her prior history of cardotoxic drug therapy for AML.     History obtained from the medical record and the patients mother. The patient has Down Syndrome. Has no chronic complaints other than constipation. Per mother never noted to be SOB, no chest pain, no syncope, no edema. Active but not always physically.     Received Doxorubicin with her chemotherapy but at this time total dose is unknown. To the mothers knowledge never had any cardiac complications from that. She did have a small ASD and VSD when she was younger that closed spontaneously - no procedure or surgery to close. No history of arrhythmia.     No specific concern other than to check given her chemotherapy history. Was told by her pediatric oncologist several  years ago that if there were no problems at 20 years it would be unlikely to develop any.     Cancer Diagnosis: AML - diagnosed at age 2 / 2002  Current Oncologist: Linda  Treatment: Clinical trial [id number] - cytarabine, daunorubicin, oral thioguanine. Treatment repeated every 28 days for 4 courses. Folllowed by [facility] until lost to follow-up during COVID.   Total dose: unknown    SH: nonsmoker  FH: negative for premature CAD or CHF.     Testing:  Echo 11/22/24: EF 61%, strain -19.4 no ASD or VSD seen.   Echo 1/18/17: EF 68%, strain -21%  EKG 5/30/24 Sinus tachycardia, rsr' V1, otherwise normal EKG  Listed history of ASD/VSD without surgical intervention    Allergies:     No Known Allergies     Outpatient Medications:     Current Outpatient Medications   Medication  Instructions    cholecalciferol (vitamin D3) (VITAMIN D3) 1 Units, oral, Daily    ciprofloxacin-dexamethasone (CiproDEX) otic suspension 4 drops, Left Ear, 2 times daily    clindamycin (Cleocin T) 1 % lotion Topical, 2 times daily    doxycycline (VIBRAMYCIN) 100 mg, oral, Daily, Take with at least 8 ounces (large glass) of water, do not lie down for 30 minutes after        Past Medical History:     Past Medical History:   Diagnosis Date    Personal history of leukemia 01/22/2015    History of leukemia    Personal history of leukemia 08/23/2021    History of acute myeloid leukemia in remission    Personal history of other infectious and parasitic diseases 11/05/2020    History of viral infection    Selective deficiency of immunoglobulin m (igm)     Decreased immunoglobulin M       Social History:     Social History     Socioeconomic History    Marital status: Single   Tobacco Use    Smoking status: Never    Smokeless tobacco: Never   Vaping Use    Vaping status: Never Used   Substance and Sexual Activity    Alcohol use: Never    Drug use: Never     Social Drivers of Health     Financial Resource Strain: Low Risk  (11/10/2020)    Received from [facility]    Overall Financial Resource Strain (CARDIA)     Difficulty of Paying Living Expenses: Not hard at all   Food Insecurity: No Food Insecurity (11/10/2020)    Received from [facility]    Hunger Vital Sign     Worried About Running Out of Food in the Last Year: Never true     Ran Out of Food in the Last Year: Never true   Transportation Needs: No Transportation Needs (11/10/2020)    Received from [facility]    PRAPARE - Transportation     Lack of Transportation (Medical): No     Lack of Transportation (Non-Medical): No       Family History:      No family history on file.    Review of Systems:     Review of Systems   Constitutional:  Negative for activity change, chills, fatigue and fever.   HENT:   Positive for ear pain.    Respiratory:  Negative for shortness of breath.    Cardiovascular: Negative.    Gastrointestinal:  Positive for constipation.   Neurological:  Negative for dizziness and light-headedness.   All other systems reviewed and are negative.      Objective:     Visit Vitals  /56 (BP Location: Left arm, Patient Position: Sitting, BP Cuff Size: Adult)   Pulse 80   Temp 37.2 °C (99 °F) (Temporal)   Resp 22   Wt 61.6 kg (135 lb 12.9 oz)   SpO2 100%   BMI 27.43 kg/m²   OB Status Implant   Smoking Status Never   BSA 1.6 m²         Physical Examination:   GENERAL:  Well developed, well nourished, in no acute distress.  HEENT: NC AT EOMI with anicteric sclera, slanting epicanthal folds  NECK:  reduced ROM, no JVD, no bruit.  CHEST:  Symmetric and nontender.  LUNGS:  Normal respiratory effort. Bilateral breath sounds clear to auscultation. Diminished at the bases.   HEART:  PMI nondisplaced, RRR with normal S1 and S2, no S3. Soft DONTRELL RUSB, no diastolic murmur. No pitting edema, normal distal perfusion.   PERIPHERAL VASCULAR:  Pulses present and equally palpable; 2+ throughout.  ABDOMEN: Soft, NT, ND without HSM or palpable organomegaly, no bruits, normoactive bowel sounds  MUSCULOSKELETAL: No significant joint or limb deformity. Short trunk  EXTREMITIES:  Warm with good color, no clubbing or cyanosis.  There is no edema noted.  NEURO/PSYCH:  Alert  with approppriate behavior and responses. No focal motor weakness. Normal gait  LYMPH: no significant palpable lymphadenopathy  SKIN: No rashes on exposed skin, no reported skin lesions.     Lab:     CBC:   Lab Results   Component Value Date    WBC 5.4 10/17/2024    RBC 4.08 10/17/2024    HGB 13.3 10/17/2024    HCT 40.7 10/17/2024     10/17/2024      Lab Results   Component Value Date    WBC 5.4 10/17/2024    WBC 8.3 05/16/2024    WBC 11.5 (H) 05/16/2024    RBC 4.08 10/17/2024    RBC 3.85 (L) 05/16/2024    RBC 4.13 05/16/2024    HGB 13.3 10/17/2024  "   HGB 12.6 05/16/2024    HGB 13.6 05/16/2024    HCT 40.7 10/17/2024    HCT 38.1 05/16/2024    HCT 40.7 05/16/2024     10/17/2024    MCV 99 05/16/2024    MCV 99 05/16/2024    MCH 32.6 10/17/2024    MCH 32.7 05/16/2024    MCH 32.9 05/16/2024    MCHC 32.7 10/17/2024    MCHC 33.1 05/16/2024    MCHC 33.4 05/16/2024    RDW 13.6 10/17/2024    RDW 13.9 05/16/2024    RDW 13.6 05/16/2024     10/17/2024     05/16/2024     05/16/2024       CMP:    Lab Results   Component Value Date     10/17/2024    K 4.2 10/17/2024     10/17/2024    CO2 26 10/17/2024    BUN 14 10/17/2024    CREATININE 0.73 10/17/2024    GLUCOSE 82 10/17/2024    CALCIUM 9.3 10/17/2024     Lab Results   Component Value Date     10/17/2024     (L) 05/16/2024     (L) 05/16/2024    K 4.2 10/17/2024    K 3.2 (L) 05/16/2024    K 3.6 05/16/2024     10/17/2024    CL 99 05/16/2024    CL 99 05/16/2024    CO2 26 10/17/2024    CO2 25 05/16/2024    CO2 27 05/16/2024    BUN 14 10/17/2024    BUN 13 05/16/2024    BUN 16 05/16/2024    CREATININE 0.73 10/17/2024    CREATININE 0.74 05/16/2024    CREATININE 0.77 05/16/2024     Lab Results   Component Value Date    ALKPHOS 56 10/17/2024    ALT 15 10/17/2024    AST 15 10/17/2024    PROT 7.1 10/17/2024    BILITOT 0.6 10/17/2024       Magnesium:    Lab Results   Component Value Date    MG 1.73 05/16/2024       Lipid Profile:    Lab Results   Component Value Date    TRIG 53 10/17/2024    HDL 45.2 10/17/2024    LDLCALC 132 (H) 10/17/2024       TSH:    Lab Results   Component Value Date    TSH 2.03 10/17/2024       BNP:   No results found for: \"BNP\"     PT/INR:    No results found for: \"PROTIME\", \"INR\"    HgBA1c:    No results found for: \"HGBA1C\"    Cardiac Enzymes:    No results found for: \"TROPHS\"    Labs reviewed.   Diagnostic Studies:     Echo 12/16/24 Normal LV function, EF 61%, strain -19.4. No significant valvular abnormalities  EKG Sinus tachycardia, rsr' V1, " otherwise normal  Radiology:     No valid procedures specified.    Assessment:     Problem List Items Addressed This Visit       AML (acute myeloid leukemia) (Multi)    Status post administration of cardiotoxic chemotherapy - Primary    Hx of atrial septal defect    Hx of ventricular septal defect    Elevated LDL cholesterol level     Other Visit Diagnoses       AML (acute myeloblastic leukemia) (Multi)            AML with history of cardiotoxic chemotherapy. No signs or symptoms of heart failure. Normal LV function and strain by echo. Unlilkely to develop CHF this late from her treatment. Would recommend repeat echo in 5 years in the absence of symptoms.   Hx of ASD and VSD. Commonly seen in Down Syndrome patients. Obviously no longer present.   Elevated LDL cholesterol. Moderately elevated. At her age no specific Rx at this time - dietary changes would be appropriate.     Patient can be seen back in 5 years in the absence of any new symptoms or problems. Please refer back sooner if any issues. Discussed with the patients parents.     Plan:

## 2024-12-30 ENCOUNTER — APPOINTMENT (OUTPATIENT)
Dept: UROLOGY | Facility: CLINIC | Age: 24
End: 2024-12-30
Payer: COMMERCIAL

## 2025-01-06 ENCOUNTER — APPOINTMENT (OUTPATIENT)
Dept: OTOLARYNGOLOGY | Facility: CLINIC | Age: 25
End: 2025-01-06
Payer: COMMERCIAL

## 2025-01-06 ENCOUNTER — APPOINTMENT (OUTPATIENT)
Dept: AUDIOLOGY | Facility: CLINIC | Age: 25
End: 2025-01-06
Payer: MEDICARE

## 2025-06-16 ENCOUNTER — OFFICE VISIT (OUTPATIENT)
Dept: URGENT CARE | Age: 25
End: 2025-06-16
Payer: MEDICARE

## 2025-06-16 VITALS
OXYGEN SATURATION: 100 % | TEMPERATURE: 98.4 F | HEART RATE: 68 BPM | SYSTOLIC BLOOD PRESSURE: 98 MMHG | RESPIRATION RATE: 15 BRPM | DIASTOLIC BLOOD PRESSURE: 64 MMHG

## 2025-06-16 DIAGNOSIS — R39.89 GENITAL SORE: Primary | ICD-10-CM

## 2025-06-16 RX ORDER — CLINDAMYCIN PHOSPHATE 10 MG/G
GEL TOPICAL 2 TIMES DAILY
Qty: 30 G | Refills: 0 | Status: SHIPPED | OUTPATIENT
Start: 2025-06-16 | End: 2025-06-26

## 2025-06-16 RX ORDER — DOXYCYCLINE HYCLATE 100 MG
100 TABLET ORAL 2 TIMES DAILY
Qty: 20 TABLET | Refills: 0 | Status: SHIPPED | OUTPATIENT
Start: 2025-06-16 | End: 2025-06-26

## 2025-06-16 ASSESSMENT — ENCOUNTER SYMPTOMS
MUSCULOSKELETAL NEGATIVE: 1
PSYCHIATRIC NEGATIVE: 1
EYES NEGATIVE: 1
NEUROLOGICAL NEGATIVE: 1
HEMATOLOGIC/LYMPHATIC NEGATIVE: 1
ENDOCRINE NEGATIVE: 1
GASTROINTESTINAL NEGATIVE: 1
PALPITATIONS: 0
CONSTITUTIONAL NEGATIVE: 1
ALLERGIC/IMMUNOLOGIC NEGATIVE: 1
RESPIRATORY NEGATIVE: 1

## 2025-06-16 NOTE — PROGRESS NOTES
Subjective   Patient ID: Estela Molina is a 25 y.o. female. They present today with a chief complaint of Other (Sore on labia. Poss inf. 2 days).    History of Present Illness  HPI  Pt presents to urgent care with   Mother reports patient has been complaining of a painful sore in her genital area for the past 2 days.  Mother reports she noticed some purulent drainage last night.  Reports patient does have a history of hidradenitis suppurativa but usually gets abscesses in her groin area and never on her genitals.  Reports she is concerned for possible herpes.  Patient denies dysuria, hematuria.  Mother denies fevers.      Past Medical History  Allergies as of 06/16/2025    (No Known Allergies)       Prescriptions Prior to Admission[1]     Medical History[2]    Surgical History[3]     reports that she has never smoked. She has never used smokeless tobacco. She reports that she does not drink alcohol and does not use drugs.    Review of Systems  Review of Systems   Constitutional: Negative.    HENT: Negative.     Eyes: Negative.    Respiratory: Negative.     Cardiovascular:  Negative for chest pain and palpitations.   Gastrointestinal: Negative.    Endocrine: Negative.    Genitourinary:  Positive for genital sores.   Musculoskeletal: Negative.    Skin: Negative.    Allergic/Immunologic: Negative.    Neurological: Negative.    Hematological: Negative.    Psychiatric/Behavioral: Negative.     All other systems reviewed and are negative.                                 Objective    Vitals:    06/16/25 0810   BP: 98/64   Pulse: 68   Resp: 15   Temp: 36.9 °C (98.4 °F)   SpO2: 100%     No LMP recorded. Patient has had an implant.    Physical Exam  Vitals and nursing note reviewed.   Constitutional:       General: She is not in acute distress.     Appearance: Normal appearance. She is not ill-appearing or toxic-appearing.   HENT:      Head: Atraumatic.      Mouth/Throat:      Mouth: Mucous membranes are moist.       Pharynx: Oropharynx is clear.   Eyes:      Extraocular Movements: Extraocular movements intact.      Conjunctiva/sclera: Conjunctivae normal.      Pupils: Pupils are equal, round, and reactive to light.   Cardiovascular:      Rate and Rhythm: Normal rate.   Pulmonary:      Effort: Pulmonary effort is normal.   Genitourinary:      Skin:     General: Skin is warm and dry.   Neurological:      General: No focal deficit present.      Mental Status: She is alert and oriented to person, place, and time.   Psychiatric:         Mood and Affect: Mood normal.         Behavior: Behavior normal.         Thought Content: Thought content normal.         Procedures    Point of Care Test & Imaging Results from this visit  No results found for this visit on 06/16/25.   Imaging  No results found.    Cardiology, Vascular, and Other Imaging  No other imaging results found for the past 2 days      Diagnostic study results (if any) were reviewed by FERNY Ceballos.    Assessment/Plan   Allergies, medications, history, and pertinent labs/EKGs/Imaging reviewed by FERNY Ceballos.     Medical Decision Making   Patient presents with 1 small, red, erythematous lesion to the outer genitalia area.  Reports it is tender to palpation.  Mother reports drainage last night.  No drainage noted at this time.  Mother is concerned for possible herpes.  Swabs obtained for HSV 1 and 2 as well as a wound culture.  Given history of hydradenitis suppurativa will give prescription Doxy as well as topical Clindagel.  Mother advised swab results will be back in the next 24 to 48 hours and she will be contacted with results.  We will call in additional antibiotics if necessary.  She was advised to follow-up with PCP.At time of discharge patient was clinically well-appearing and HDS for outpatient management. The patient was educated regarding diagnosis, supportive care, OTC and Rx medications. The patient was given the opportunity to ask  questions prior to discharge.  They verbalized understanding of my discussion of the plans for treatment, expected course, indications to return to  or seek further evaluation in ED, and the need for timely follow up as directed.   They were provided with a work/school excuse if requested.       Orders and Diagnoses  Diagnoses and all orders for this visit:  Genital sore  -     doxycycline (Vibra-Tabs) 100 mg tablet; Take 1 tablet (100 mg) by mouth 2 times a day for 10 days. Take with a full glass of water and do not lie down for at least 30 minutes after.  -     clindamycin (Cleocin T) 1 % gel; Apply topically 2 times a day for 10 days.  -     QUEST HSV, TYPE 1 AND 2 DNA, QL REAL TIME PCR  -     [facility] CULTURE, AEROBIC AND ANAEROBIC W/GRAM STAIN      Medical Admin Record      Patient disposition: Home    Electronically signed by FERNY Ceballos  9:23 AM           [1] (Not in a hospital admission)   [2]   Past Medical History:  Diagnosis Date    Personal history of leukemia 01/22/2015    History of leukemia    Personal history of leukemia 08/23/2021    History of acute myeloid leukemia in remission    Personal history of other infectious and parasitic diseases 11/05/2020    History of viral infection    Selective deficiency of immunoglobulin m (igm)     Decreased immunoglobulin M   [3]   Past Surgical History:  Procedure Laterality Date    OTHER SURGICAL HISTORY  08/23/2021    Bone biopsy    OTHER SURGICAL HISTORY  08/23/2021    Bone marrow biopsy    OTHER SURGICAL HISTORY  04/21/2022    Ear pressure equalization tube insertion

## 2025-06-19 LAB
BACTERIA GENITAL AEROBE CULT: NORMAL
BACTERIA SPEC AEROBE CULT: NORMAL
BACTERIA SPEC ANAEROBE CULT: NORMAL
HSV1 DNA SPEC QL NAA+PROBE: NORMAL

## 2025-07-21 ENCOUNTER — APPOINTMENT (OUTPATIENT)
Facility: CLINIC | Age: 25
End: 2025-07-21
Payer: MEDICARE

## 2025-10-22 ENCOUNTER — APPOINTMENT (OUTPATIENT)
Dept: PRIMARY CARE | Facility: CLINIC | Age: 25
End: 2025-10-22
Payer: MEDICARE

## 2025-11-21 ENCOUNTER — APPOINTMENT (OUTPATIENT)
Dept: HEMATOLOGY/ONCOLOGY | Facility: HOSPITAL | Age: 25
End: 2025-11-21
Payer: MEDICARE